# Patient Record
Sex: MALE | Race: WHITE | NOT HISPANIC OR LATINO | URBAN - METROPOLITAN AREA
[De-identification: names, ages, dates, MRNs, and addresses within clinical notes are randomized per-mention and may not be internally consistent; named-entity substitution may affect disease eponyms.]

---

## 2020-06-16 ENCOUNTER — OFFICE VISIT (OUTPATIENT)
Dept: URBAN - METROPOLITAN AREA SURGERY CENTER 30 | Facility: SURGERY CENTER | Age: 70
End: 2020-06-16

## 2020-11-18 ENCOUNTER — WEB ENCOUNTER (OUTPATIENT)
Dept: URBAN - METROPOLITAN AREA CLINIC 74 | Facility: CLINIC | Age: 70
End: 2020-11-18

## 2020-11-18 ENCOUNTER — LAB OUTSIDE AN ENCOUNTER (OUTPATIENT)
Dept: URBAN - METROPOLITAN AREA CLINIC 74 | Facility: CLINIC | Age: 70
End: 2020-11-18

## 2020-11-18 ENCOUNTER — OFFICE VISIT (OUTPATIENT)
Dept: URBAN - METROPOLITAN AREA CLINIC 74 | Facility: CLINIC | Age: 70
End: 2020-11-18
Payer: MEDICARE

## 2020-11-18 VITALS
WEIGHT: 179.2 LBS | HEART RATE: 73 BPM | DIASTOLIC BLOOD PRESSURE: 76 MMHG | BODY MASS INDEX: 23.75 KG/M2 | SYSTOLIC BLOOD PRESSURE: 150 MMHG | TEMPERATURE: 98.1 F | HEIGHT: 73 IN

## 2020-11-18 DIAGNOSIS — J44.9 COPD (CHRONIC OBSTRUCTIVE PULMONARY DISEASE): ICD-10-CM

## 2020-11-18 DIAGNOSIS — J45.909 ASTHMA: ICD-10-CM

## 2020-11-18 DIAGNOSIS — K21.9 GERD (GASTROESOPHAGEAL REFLUX DISEASE): ICD-10-CM

## 2020-11-18 DIAGNOSIS — Z79.01 CHRONIC ANTICOAGULATION: ICD-10-CM

## 2020-11-18 DIAGNOSIS — Z86.010 PERSONAL HISTORY OF COLONIC POLYPS: ICD-10-CM

## 2020-11-18 DIAGNOSIS — K59.01 CONSTIPATION, SLOW TRANSIT: ICD-10-CM

## 2020-11-18 DIAGNOSIS — N81.6 RECTOCELE: ICD-10-CM

## 2020-11-18 DIAGNOSIS — I10 ESSENTIAL HYPERTENSION: ICD-10-CM

## 2020-11-18 DIAGNOSIS — I73.9 PERIPHERAL VASCULAR DISEASE: ICD-10-CM

## 2020-11-18 DIAGNOSIS — R14.0 BLOATING: ICD-10-CM

## 2020-11-18 DIAGNOSIS — Z85.46 HISTORY OF PROSTATE CANCER: ICD-10-CM

## 2020-11-18 PROCEDURE — G8420 CALC BMI NORM PARAMETERS: HCPCS | Performed by: INTERNAL MEDICINE

## 2020-11-18 PROCEDURE — G8754 DIAS BP LESS 90: HCPCS | Performed by: INTERNAL MEDICINE

## 2020-11-18 PROCEDURE — G8427 DOCREV CUR MEDS BY ELIG CLIN: HCPCS | Performed by: INTERNAL MEDICINE

## 2020-11-18 PROCEDURE — G8753 SYS BP > OR = 140: HCPCS | Performed by: INTERNAL MEDICINE

## 2020-11-18 PROCEDURE — 99213 OFFICE O/P EST LOW 20 MIN: CPT | Performed by: INTERNAL MEDICINE

## 2020-11-18 PROCEDURE — 3017F COLORECTAL CA SCREEN DOC REV: CPT | Performed by: INTERNAL MEDICINE

## 2020-11-18 PROCEDURE — G8482 FLU IMMUNIZE ORDER/ADMIN: HCPCS | Performed by: INTERNAL MEDICINE

## 2020-11-18 PROCEDURE — 1036F TOBACCO NON-USER: CPT | Performed by: INTERNAL MEDICINE

## 2020-11-18 RX ORDER — RIVAROXABAN 20 MG/1
TAKE 1 TABLET (20 MG) BY ORAL ROUTE ONCE DAILY WITH THE EVENING MEAL TABLET, FILM COATED ORAL 1
Qty: 0 | Refills: 0 | Status: ACTIVE | COMMUNITY
Start: 1900-01-01

## 2020-11-18 RX ORDER — DICLOFENAC SODIUM 10 MG/G
GEL TOPICAL
Qty: 0 | Refills: 0 | Status: ACTIVE | COMMUNITY
Start: 1900-01-01

## 2020-11-18 RX ORDER — GABAPENTIN 300 MG/1
TAKE 1 CAPSULE (300 MG) BY ORAL ROUTE 3 TIMES PER DAY CAPSULE ORAL
Qty: 0 | Refills: 0 | Status: ACTIVE | COMMUNITY
Start: 1900-01-01

## 2020-11-18 RX ORDER — CLOPIDOGREL BISULFATE 75 MG
TAKE 1 TABLET (75 MG) BY ORAL ROUTE ONCE DAILY TABLET ORAL 1
Qty: 0 | Refills: 0 | Status: ACTIVE | COMMUNITY
Start: 1900-01-01

## 2020-11-18 RX ORDER — ATORVASTATIN CALCIUM 20 MG/1
TABLET, FILM COATED ORAL
Qty: 0 | Refills: 0 | Status: ACTIVE | COMMUNITY
Start: 1900-01-01

## 2020-11-18 RX ORDER — LISINOPRIL AND HYDROCHLOROTHIAZIDE TABLETS 20; 12.5 MG/1; MG/1
TAKE 1 TABLET BY ORAL ROUTE ONCE DAILY TABLET ORAL 1
Qty: 0 | Refills: 0 | Status: ACTIVE | COMMUNITY
Start: 1900-01-01

## 2020-11-18 RX ORDER — ASPIRIN 81 MG/1
TAKE 1 TABLET (81 MG) BY ORAL ROUTE ONCE DAILY TABLET, COATED ORAL 1
Qty: 0 | Refills: 0 | Status: ACTIVE | COMMUNITY
Start: 1900-01-01

## 2020-11-18 NOTE — HPI-TODAY'S VISIT:
Today November 18, 2020 the patient returns for a follow-up visit, the patient was last seen in the office on February 24, 2020 with generalized abdominal tenderness, constipation due to slow transit and outlet dysfunction, abdominal bloating, personal history of colonic polyps, chronic anticoagulation, peripheral vascular disease, history of prostate cancer, COPD, hypertension, asthma and a history of poliomyelitis. The patient had a colonoscopy 4 years before the visit and it showed several serrated adenomas.. The patient was advised to have a sits marker study, and an MR defecography and a colonoscopy. The patient had the MR defecogram on March 6, 2020 and it showed a small anterior rectocele, no evidence of rectal intussusception or dyssynergic defecation. The patient had the sits marker study and on March 11, 2022 frontal views of the abdomen where evaluated, on that particular day no definite markers were seen in the ascending and transverse colon.  There was one marker seen in the left colon.  There was a moderate amount of stool and gas seen throughout the colon, there was no evidence of bowel obstruction. The colonoscopy was not performed due to scheduling issues including COVID-19. The patient has been cleared by vascular surgery to hold anticoagulation. The patient continues to have severe constipation, along with a constipation he gets pain across the lower back and upper abdomen, he denies any nausea, vomiting, rectal bleeding or hematochezia. The patient will be scheduled to have the colonoscopy.  During a previous colonoscopy I did remove multiple colonic polyps, benefits potential complications and alternatives to colonoscopy have been disclosed.

## 2020-12-07 ENCOUNTER — OFFICE VISIT (OUTPATIENT)
Dept: URBAN - METROPOLITAN AREA SURGERY CENTER 30 | Facility: SURGERY CENTER | Age: 70
End: 2020-12-07
Payer: MEDICARE

## 2020-12-07 DIAGNOSIS — K62.1 DYSPLASTIC POLYP OF RECTUM: ICD-10-CM

## 2020-12-07 DIAGNOSIS — D12.2 ADENOMA OF ASCENDING COLON: ICD-10-CM

## 2020-12-07 DIAGNOSIS — D12.0 ADENOMA OF CECUM: ICD-10-CM

## 2020-12-07 DIAGNOSIS — Z86.010 H/O ADENOMATOUS POLYP OF COLON: ICD-10-CM

## 2020-12-07 DIAGNOSIS — D12.3 ADENOMA OF TRANSVERSE COLON: ICD-10-CM

## 2020-12-07 PROCEDURE — G8907 PT DOC NO EVENTS ON DISCHARG: HCPCS | Performed by: INTERNAL MEDICINE

## 2020-12-07 PROCEDURE — 45385 COLONOSCOPY W/LESION REMOVAL: CPT | Performed by: INTERNAL MEDICINE

## 2020-12-07 PROCEDURE — G9936 PMH PLYP/NEO CO/RECT/JUN/ANS: HCPCS | Performed by: INTERNAL MEDICINE

## 2020-12-07 PROCEDURE — 45380 COLONOSCOPY AND BIOPSY: CPT | Performed by: INTERNAL MEDICINE

## 2020-12-07 RX ORDER — ATORVASTATIN CALCIUM 20 MG/1
TABLET, FILM COATED ORAL
Qty: 0 | Refills: 0 | Status: ACTIVE | COMMUNITY
Start: 1900-01-01

## 2020-12-07 RX ORDER — ASPIRIN 81 MG/1
TAKE 1 TABLET (81 MG) BY ORAL ROUTE ONCE DAILY TABLET, COATED ORAL 1
Qty: 0 | Refills: 0 | Status: ACTIVE | COMMUNITY
Start: 1900-01-01

## 2020-12-07 RX ORDER — GABAPENTIN 300 MG/1
TAKE 1 CAPSULE (300 MG) BY ORAL ROUTE 3 TIMES PER DAY CAPSULE ORAL
Qty: 0 | Refills: 0 | Status: ACTIVE | COMMUNITY
Start: 1900-01-01

## 2020-12-07 RX ORDER — LISINOPRIL AND HYDROCHLOROTHIAZIDE TABLETS 20; 12.5 MG/1; MG/1
TAKE 1 TABLET BY ORAL ROUTE ONCE DAILY TABLET ORAL 1
Qty: 0 | Refills: 0 | Status: ACTIVE | COMMUNITY
Start: 1900-01-01

## 2020-12-07 RX ORDER — DICLOFENAC SODIUM 10 MG/G
GEL TOPICAL
Qty: 0 | Refills: 0 | Status: ACTIVE | COMMUNITY
Start: 1900-01-01

## 2020-12-07 RX ORDER — RIVAROXABAN 20 MG/1
TAKE 1 TABLET (20 MG) BY ORAL ROUTE ONCE DAILY WITH THE EVENING MEAL TABLET, FILM COATED ORAL 1
Qty: 0 | Refills: 0 | Status: ACTIVE | COMMUNITY
Start: 1900-01-01

## 2020-12-07 RX ORDER — CLOPIDOGREL BISULFATE 75 MG
TAKE 1 TABLET (75 MG) BY ORAL ROUTE ONCE DAILY TABLET ORAL 1
Qty: 0 | Refills: 0 | Status: ACTIVE | COMMUNITY
Start: 1900-01-01

## 2021-01-07 ENCOUNTER — OFFICE VISIT (OUTPATIENT)
Dept: URBAN - METROPOLITAN AREA CLINIC 74 | Facility: CLINIC | Age: 71
End: 2021-01-07
Payer: MEDICARE

## 2021-01-07 VITALS
OXYGEN SATURATION: 98 % | WEIGHT: 195.4 LBS | HEART RATE: 65 BPM | BODY MASS INDEX: 25.9 KG/M2 | HEIGHT: 73 IN | SYSTOLIC BLOOD PRESSURE: 142 MMHG | TEMPERATURE: 97.5 F | DIASTOLIC BLOOD PRESSURE: 68 MMHG

## 2021-01-07 DIAGNOSIS — D12.6 SERRATED ADENOMA OF COLON: ICD-10-CM

## 2021-01-07 DIAGNOSIS — D12.2 ADENOMATOUS POLYP OF ASCENDING COLON: ICD-10-CM

## 2021-01-07 DIAGNOSIS — Z86.010 PERSONAL HISTORY OF COLONIC POLYPS: ICD-10-CM

## 2021-01-07 DIAGNOSIS — K21.9 GERD (GASTROESOPHAGEAL REFLUX DISEASE): ICD-10-CM

## 2021-01-07 PROCEDURE — G8419 CALC BMI OUT NRM PARAM NOF/U: HCPCS | Performed by: INTERNAL MEDICINE

## 2021-01-07 PROCEDURE — 99213 OFFICE O/P EST LOW 20 MIN: CPT | Performed by: INTERNAL MEDICINE

## 2021-01-07 PROCEDURE — G8427 DOCREV CUR MEDS BY ELIG CLIN: HCPCS | Performed by: INTERNAL MEDICINE

## 2021-01-07 PROCEDURE — 3017F COLORECTAL CA SCREEN DOC REV: CPT | Performed by: INTERNAL MEDICINE

## 2021-01-07 PROCEDURE — G8753 SYS BP > OR = 140: HCPCS | Performed by: INTERNAL MEDICINE

## 2021-01-07 PROCEDURE — G8754 DIAS BP LESS 90: HCPCS | Performed by: INTERNAL MEDICINE

## 2021-01-07 PROCEDURE — G9903 PT SCRN TBCO ID AS NON USER: HCPCS | Performed by: INTERNAL MEDICINE

## 2021-01-07 PROCEDURE — 1036F TOBACCO NON-USER: CPT | Performed by: INTERNAL MEDICINE

## 2021-01-07 PROCEDURE — G8482 FLU IMMUNIZE ORDER/ADMIN: HCPCS | Performed by: INTERNAL MEDICINE

## 2021-01-07 RX ORDER — ATORVASTATIN CALCIUM 20 MG/1
TABLET, FILM COATED ORAL
Qty: 0 | Refills: 0 | Status: ACTIVE | COMMUNITY
Start: 1900-01-01

## 2021-01-07 RX ORDER — DICLOFENAC SODIUM 10 MG/G
GEL TOPICAL
Qty: 0 | Refills: 0 | Status: ACTIVE | COMMUNITY
Start: 1900-01-01

## 2021-01-07 RX ORDER — LISINOPRIL AND HYDROCHLOROTHIAZIDE TABLETS 20; 12.5 MG/1; MG/1
TAKE 1 TABLET BY ORAL ROUTE ONCE DAILY TABLET ORAL 1
Qty: 0 | Refills: 0 | Status: ACTIVE | COMMUNITY
Start: 1900-01-01

## 2021-01-07 RX ORDER — GABAPENTIN 300 MG/1
TAKE 1 CAPSULE (300 MG) BY ORAL ROUTE 3 TIMES PER DAY CAPSULE ORAL
Qty: 0 | Refills: 0 | Status: ACTIVE | COMMUNITY
Start: 1900-01-01

## 2021-01-07 RX ORDER — ASPIRIN 81 MG/1
TAKE 1 TABLET (81 MG) BY ORAL ROUTE ONCE DAILY TABLET, COATED ORAL 1
Qty: 0 | Refills: 0 | Status: ACTIVE | COMMUNITY
Start: 1900-01-01

## 2021-01-07 RX ORDER — PANTOPRAZOLE SODIUM 40 MG/1
TAKE 1 TABLET (40 MG) BY ORAL ROUTE ONCE DAILY FOR 30 DAYS TABLET, DELAYED RELEASE ORAL ONCE A DAY
Qty: 90 | Refills: 1 | OUTPATIENT
Start: 2021-01-07

## 2021-01-07 RX ORDER — CLOPIDOGREL BISULFATE 75 MG
TAKE 1 TABLET (75 MG) BY ORAL ROUTE ONCE DAILY TABLET ORAL 1
Qty: 0 | Refills: 0 | Status: ACTIVE | COMMUNITY
Start: 1900-01-01

## 2021-01-07 RX ORDER — RIVAROXABAN 20 MG/1
TAKE 1 TABLET (20 MG) BY ORAL ROUTE ONCE DAILY WITH THE EVENING MEAL TABLET, FILM COATED ORAL 1
Qty: 0 | Refills: 0 | Status: ACTIVE | COMMUNITY
Start: 1900-01-01

## 2021-01-07 NOTE — HPI-TODAY'S VISIT:
Today November 18, 2020 the patient returns for a follow-up visit, the patient was last seen in the office on February 24, 2020 with generalized abdominal tenderness, constipation due to slow transit and outlet dysfunction, abdominal bloating, personal history of colonic polyps, chronic anticoagulation, peripheral vascular disease, history of prostate cancer, COPD, hypertension, asthma and a history of poliomyelitis. The patient had a colonoscopy 4 years before the visit and it showed several serrated adenomas.. The patient was advised to have a sits marker study, and an MR defecography and a colonoscopy. The patient had the MR defecogram on March 6, 2020 and it showed a small anterior rectocele, no evidence of rectal intussusception or dyssynergic defecation. The patient had the sits marker study and on March 11, 2022 frontal views of the abdomen where evaluated, on that particular day no definite markers were seen in the ascending and transverse colon.  There was one marker seen in the left colon.  There was a moderate amount of stool and gas seen throughout the colon, there was no evidence of bowel obstruction. The colonoscopy was not performed due to scheduling issues including COVID-19. The patient has been cleared by vascular surgery to hold anticoagulation. The patient continues to have severe constipation, along with a constipation he gets pain across the lower back and upper abdomen, he denies any nausea, vomiting, rectal bleeding or hematochezia. The patient will be scheduled to have the colonoscopy.  During a previous colonoscopy I did remove multiple colonic polyps, benefits potential complications and alternatives to colonoscopy have been disclosed. Today January 7, 2021 the patient returns for a follow-up visit, the patient was last seen in the office on November 18, 2020 with slow transit constipation, and rectocele, personal history of colonic polyps, bloating, GERD, history of prostate cancer, asthma, essential hypertension, COPD, peripheral vascular disease on chronic anticoagulation. At the time of the visit the patient complaint of severe constipation, along with a constipation he did get pain across the lower back and upper abdomen, the patient denied having any nausea, vomiting or rectal bleeding.  The patient was scheduled to have a colonoscopy, during the previous colonoscopy I did remove multiple colonic polyps.  The patient had a sitz marker study in March 2020 that showed a single marker in the left colon, there was a moderate amount of stool and gas throughout the colon with no evidence of obstruction.  An MR defecography showed a small anterior rectocele with no evidence of rectal intussusception or dyssynergic defecation. The colonoscopy was performed on December 17, 2020, multiple polyps were encountered, there was a 20 mm sessile cecal serrated adenomatous polyp which was removed with a hot snare, a 5 mm ascending colon sessile serrated adenomatous polyp removed with a cold snare, a 12 mm ascending colon semipedunculated adenomatous polyp removed with a hot snare, the patient required 1 hemostatic clip to prevent bleeding, there was a 5 mm sessile polyp removed from the transverse colon with a cold snare and a 3 mm rectal hyperplastic polyp removed from the rectum with cold biopsy forceps. Today the patient returns stating that he continues to have some difficulty expelling stool, slight constipation, in the past he did respond to MiraLAX 17 g and he has been advised to restart MiraLAX 17 g either daily or every other day. The patient denied having any abdominal pain but does complain of increasing flatulence which improves after defecation. The patient's gastroesophageal reflux remains a problem, the patient was instructed to follow antireflux measures and diet, the patient will start pantoprazole 40 mg daily. I discussed at length with the sitz marker study and the MR defecogram, the patient is not aware that he does have a small rectocele. The patient has been advised to have a surveillance colonoscopy in 6 months, he did have several serrated adenomatous polyps, and in the past multiple polyps had been removed. The patient will return for a follow-up visit in 6 months and will be scheduled for the colonoscopy at the time.

## 2021-03-25 ENCOUNTER — ERX REFILL RESPONSE (OUTPATIENT)
Dept: URBAN - METROPOLITAN AREA CLINIC 74 | Facility: CLINIC | Age: 71
End: 2021-03-25

## 2021-03-25 RX ORDER — PANTOPRAZOLE SODIUM 40 MG/1
TAKE 1 TABLET (40 MG) BY ORAL ROUTE ONCE DAILY FOR 30 DAYS TABLET, DELAYED RELEASE ORAL ONCE A DAY
Qty: 90 | Refills: 0

## 2021-06-15 ENCOUNTER — ERX REFILL RESPONSE (OUTPATIENT)
Dept: URBAN - METROPOLITAN AREA CLINIC 74 | Facility: CLINIC | Age: 71
End: 2021-06-15

## 2021-06-15 RX ORDER — PANTOPRAZOLE SODIUM 40 MG/1
TAKE 1 TABLET (40 MG) BY ORAL ROUTE ONCE DAILY FOR 30 DAYS TABLET, DELAYED RELEASE ORAL ONCE A DAY
Qty: 90 | Refills: 0

## 2021-08-18 ENCOUNTER — OFFICE VISIT (OUTPATIENT)
Dept: URBAN - METROPOLITAN AREA CLINIC 74 | Facility: CLINIC | Age: 71
End: 2021-08-18
Payer: MEDICARE

## 2021-08-18 ENCOUNTER — TELEPHONE ENCOUNTER (OUTPATIENT)
Dept: URBAN - METROPOLITAN AREA CLINIC 74 | Facility: CLINIC | Age: 71
End: 2021-08-18

## 2021-08-18 ENCOUNTER — LAB OUTSIDE AN ENCOUNTER (OUTPATIENT)
Dept: URBAN - METROPOLITAN AREA CLINIC 74 | Facility: CLINIC | Age: 71
End: 2021-08-18

## 2021-08-18 ENCOUNTER — WEB ENCOUNTER (OUTPATIENT)
Dept: URBAN - METROPOLITAN AREA CLINIC 74 | Facility: CLINIC | Age: 71
End: 2021-08-18

## 2021-08-18 VITALS
HEART RATE: 70 BPM | BODY MASS INDEX: 25.37 KG/M2 | TEMPERATURE: 98.2 F | WEIGHT: 191.4 LBS | OXYGEN SATURATION: 97 % | DIASTOLIC BLOOD PRESSURE: 68 MMHG | SYSTOLIC BLOOD PRESSURE: 120 MMHG | HEIGHT: 73 IN

## 2021-08-18 DIAGNOSIS — K59.01 CONSTIPATION, SLOW TRANSIT: ICD-10-CM

## 2021-08-18 DIAGNOSIS — Z85.46 HISTORY OF PROSTATE CANCER: ICD-10-CM

## 2021-08-18 DIAGNOSIS — R14.0 BLOATING: ICD-10-CM

## 2021-08-18 DIAGNOSIS — Z86.010 PERSONAL HISTORY OF COLONIC POLYPS: ICD-10-CM

## 2021-08-18 DIAGNOSIS — Z79.01 CHRONIC ANTICOAGULATION: ICD-10-CM

## 2021-08-18 DIAGNOSIS — I10 ESSENTIAL HYPERTENSION: ICD-10-CM

## 2021-08-18 DIAGNOSIS — N81.6 RECTOCELE: ICD-10-CM

## 2021-08-18 DIAGNOSIS — J45.909 ASTHMA: ICD-10-CM

## 2021-08-18 DIAGNOSIS — J44.9 COPD (CHRONIC OBSTRUCTIVE PULMONARY DISEASE): ICD-10-CM

## 2021-08-18 DIAGNOSIS — D12.6 SERRATED ADENOMA OF COLON: ICD-10-CM

## 2021-08-18 DIAGNOSIS — K21.9 GERD (GASTROESOPHAGEAL REFLUX DISEASE): ICD-10-CM

## 2021-08-18 DIAGNOSIS — D12.2 ADENOMATOUS POLYP OF ASCENDING COLON: ICD-10-CM

## 2021-08-18 PROCEDURE — 99213 OFFICE O/P EST LOW 20 MIN: CPT | Performed by: INTERNAL MEDICINE

## 2021-08-18 RX ORDER — PANTOPRAZOLE SODIUM 40 MG/1
TAKE 1 TABLET (40 MG) BY ORAL ROUTE ONCE DAILY FOR 30 DAYS TABLET, DELAYED RELEASE ORAL ONCE A DAY
Qty: 90 | Refills: 0 | Status: ACTIVE | COMMUNITY

## 2021-08-18 RX ORDER — POLYETHYLENE GLYCOL 3350 17 G/17G
AS DIRECTED POWDER, FOR SOLUTION ORAL
Status: ACTIVE | COMMUNITY

## 2021-08-18 RX ORDER — DICLOFENAC SODIUM 10 MG/G
GEL TOPICAL
Qty: 0 | Refills: 0 | Status: ACTIVE | COMMUNITY
Start: 1900-01-01

## 2021-08-18 RX ORDER — CLOPIDOGREL BISULFATE 75 MG
TAKE 1 TABLET (75 MG) BY ORAL ROUTE ONCE DAILY TABLET ORAL 1
Qty: 0 | Refills: 0 | Status: ACTIVE | COMMUNITY
Start: 1900-01-01

## 2021-08-18 RX ORDER — LISINOPRIL AND HYDROCHLOROTHIAZIDE TABLETS 20; 12.5 MG/1; MG/1
TAKE 1 TABLET BY ORAL ROUTE ONCE DAILY TABLET ORAL 1
Qty: 0 | Refills: 0 | Status: ACTIVE | COMMUNITY
Start: 1900-01-01

## 2021-08-18 RX ORDER — GABAPENTIN 300 MG/1
TAKE 1 CAPSULE (300 MG) BY ORAL ROUTE 3 TIMES PER DAY CAPSULE ORAL
Qty: 0 | Refills: 0 | Status: ACTIVE | COMMUNITY
Start: 1900-01-01

## 2021-08-18 RX ORDER — INSULIN ASPART 100 [IU]/ML
AS DIRECTED INJECTION, SUSPENSION SUBCUTANEOUS
Status: ACTIVE | COMMUNITY

## 2021-08-18 RX ORDER — PANTOPRAZOLE SODIUM 40 MG/1
TAKE 1 TABLET (40 MG) BY ORAL ROUTE ONCE DAILY FOR 30 DAYS TABLET, DELAYED RELEASE ORAL ONCE A DAY
OUTPATIENT

## 2021-08-18 RX ORDER — SODIUM PICOSULFATE, MAGNESIUM OXIDE, AND ANHYDROUS CITRIC ACID 10; 3.5; 12 MG/160ML; G/160ML; G/160ML
160 ML LIQUID ORAL
Qty: 320 MILLILITER | Refills: 0 | OUTPATIENT
Start: 2021-08-18 | End: 2021-08-19

## 2021-08-18 RX ORDER — ASPIRIN 81 MG/1
TAKE 1 TABLET (81 MG) BY ORAL ROUTE ONCE DAILY TABLET, COATED ORAL 1
Qty: 0 | Refills: 0 | Status: DISCONTINUED | COMMUNITY
Start: 1900-01-01

## 2021-08-18 RX ORDER — STANDARDIZED SENNA CONCENTRATE 8.6 MG/1
2 TABLETS AT BEDTIME AS NEEDED TABLET ORAL ONCE A DAY
Status: ACTIVE | COMMUNITY

## 2021-08-18 RX ORDER — ATORVASTATIN CALCIUM 20 MG/1
TABLET, FILM COATED ORAL
Qty: 0 | Refills: 0 | Status: ACTIVE | COMMUNITY
Start: 1900-01-01

## 2021-08-18 RX ORDER — RIVAROXABAN 20 MG/1
TAKE 1 TABLET (20 MG) BY ORAL ROUTE ONCE DAILY WITH THE EVENING MEAL TABLET, FILM COATED ORAL 1
Qty: 0 | Refills: 0 | Status: ACTIVE | COMMUNITY
Start: 1900-01-01

## 2021-08-18 RX ORDER — STANDARDIZED SENNA CONCENTRATE 8.6 MG/1
2 TABLETS AT BEDTIME AS NEEDED TABLET ORAL ONCE A DAY
OUTPATIENT

## 2021-08-18 NOTE — HPI-TODAY'S VISIT:
Today November 18, 2020 the patient returns for a follow-up visit, the patient was last seen in the office on February 24, 2020 with generalized abdominal tenderness, constipation due to slow transit and outlet dysfunction, abdominal bloating, personal history of colonic polyps, chronic anticoagulation, peripheral vascular disease, history of prostate cancer, COPD, hypertension, asthma and a history of poliomyelitis. The patient had a colonoscopy 4 years before the visit and it showed several serrated adenomas.. The patient was advised to have a sits marker study, and an MR defecography and a colonoscopy. The patient had the MR defecogram on March 6, 2020 and it showed a small anterior rectocele, no evidence of rectal intussusception or dyssynergic defecation. The patient had the sits marker study and on March 11, 2022 frontal views of the abdomen where evaluated, on that particular day no definite markers were seen in the ascending and transverse colon.  There was one marker seen in the left colon.  There was a moderate amount of stool and gas seen throughout the colon, there was no evidence of bowel obstruction. The colonoscopy was not performed due to scheduling issues including COVID-19. The patient has been cleared by vascular surgery to hold anticoagulation. The patient continues to have severe constipation, along with a constipation he gets pain across the lower back and upper abdomen, he denies any nausea, vomiting, rectal bleeding or hematochezia. The patient will be scheduled to have the colonoscopy.  During a previous colonoscopy I did remove multiple colonic polyps, benefits potential complications and alternatives to colonoscopy have been disclosed. Today January 7, 2021 the patient returns for a follow-up visit, the patient was last seen in the office on November 18, 2020 with slow transit constipation, and rectocele, personal history of colonic polyps, bloating, GERD, history of prostate cancer, asthma, essential hypertension, COPD, peripheral vascular disease on chronic anticoagulation. At the time of the visit the patient complaint of severe constipation, along with a constipation he did get pain across the lower back and upper abdomen, the patient denied having any nausea, vomiting or rectal bleeding.  The patient was scheduled to have a colonoscopy, during the previous colonoscopy I did remove multiple colonic polyps.  The patient had a sitz marker study in March 2020 that showed a single marker in the left colon, there was a moderate amount of stool and gas throughout the colon with no evidence of obstruction.  An MR defecography showed a small anterior rectocele with no evidence of rectal intussusception or dyssynergic defecation. The colonoscopy was performed on December 17, 2020, multiple polyps were encountered, there was a 20 mm sessile cecal serrated adenomatous polyp which was removed with a hot snare, a 5 mm ascending colon sessile serrated adenomatous polyp removed with a cold snare, a 12 mm ascending colon semipedunculated adenomatous polyp removed with a hot snare, the patient required 1 hemostatic clip to prevent bleeding, there was a 5 mm sessile polyp removed from the transverse colon with a cold snare and a 3 mm rectal hyperplastic polyp removed from the rectum with cold biopsy forceps. Today the patient returns stating that he continues to have some difficulty expelling stool, slight constipation, in the past he did respond to MiraLAX 17 g and he has been advised to restart MiraLAX 17 g either daily or every other day. The patient denied having any abdominal pain but does complain of increasing flatulence which improves after defecation. The patient's gastroesophageal reflux remains a problem, the patient was instructed to follow antireflux measures and diet, the patient will start pantoprazole 40 mg daily. I discussed at length with the sitz marker study and the MR defecogram, the patient is not aware that he does have a small rectocele. The patient has been advised to have a surveillance colonoscopy in 6 months, he did have several serrated adenomatous polyps, and in the past multiple polyps had been removed. The patient will return for a follow-up visit in 6 months and will be scheduled for the colonoscopy at the time. Today August 18, 2021 the patient returns for a follow-up visit, the patient was last seen in the office on January 7, 2021 with a personal history of colonic polyps, constipation and a rectocele, gastroesophageal reflux disease, bloating, history of prostate cancer, asthma, COPD, hypertension, peripheral vascular disease on chronic anticoagulation. At the time of the last visit the patient returned stating that he had some difficulty expelling stool, the patient had slight constipation, in the past did not respond to treatment with MiraLAX 17 g and was advised to take the MiraLAX either every day or every other day. The patient denied having any abdominal pain, complains of increasing flatulence which improved after defecation. The patient's gastroesophageal reflux remains a problem, the patient was instructed to follow antireflux measures and diet, the patient was advised to take pantoprazole 40 mg daily. At the time of the visit I did discuss at length the findings of the transit time with markers and MR defecogram, the patient was made aware that he had a rectocele. The patient was advised to have a surveillance colonoscopy in 6 months.  In the past he had several serrated adenomatous polyps as well as older types of polyps.  Today the patient returns to the office stating that while taking MiraLAX for several months he did well, subsequently he stopped taking the MiraLAX, after 6 to 8 weeks he again developed constipation so he replaced the MiraLAX with Senokot, currently he has 1 or 2 soft bowel movements per day, before BMs the patient develops abdominal bloating and some flatulence which improved after defecation.  The patient denied any rectal bleeding or hematochezia.  The patient's gastroesophageal reflux remains under control with pantoprazole 40 mg daily.  The patient is due to have a surveillance colonoscopy, at the time of the previous colonoscopy he had multiple adenomatous and serrated polyps.  The patient will be scheduled for the colonoscopy.  Benefits potential complications and alternatives to colonoscopy were disclosed.

## 2021-08-18 NOTE — PHYSICAL EXAM GASTROINTESTINAL
Abdomen , soft, nontender, nondistended , no guarding or rigidity , no masses palpable , normal bowel sounds , Liver and Spleen , no hepatomegaly present , no hepatosplenomegaly , liver nontender , spleen not palpable, diastasis recti.

## 2021-09-16 ENCOUNTER — TELEPHONE ENCOUNTER (OUTPATIENT)
Dept: URBAN - METROPOLITAN AREA CLINIC 74 | Facility: CLINIC | Age: 71
End: 2021-09-16

## 2021-10-05 ENCOUNTER — OFFICE VISIT (OUTPATIENT)
Dept: URBAN - METROPOLITAN AREA SURGERY CENTER 30 | Facility: SURGERY CENTER | Age: 71
End: 2021-10-05
Payer: MEDICARE

## 2021-10-05 ENCOUNTER — CLAIMS CREATED FROM THE CLAIM WINDOW (OUTPATIENT)
Dept: URBAN - METROPOLITAN AREA CLINIC 4 | Facility: CLINIC | Age: 71
End: 2021-10-05
Payer: MEDICARE

## 2021-10-05 DIAGNOSIS — D12.4 BENIGN NEOPLASM OF DESCENDING COLON: ICD-10-CM

## 2021-10-05 DIAGNOSIS — K63.89 POLYP, HYPERPLASTIC: ICD-10-CM

## 2021-10-05 DIAGNOSIS — D12.3 BENIGN NEOPLASM OF TRANSVERSE COLON: ICD-10-CM

## 2021-10-05 DIAGNOSIS — D12.3 ADENOMA OF TRANSVERSE COLON: ICD-10-CM

## 2021-10-05 DIAGNOSIS — Z86.010 ADENOMAS PERSONAL HISTORY OF COLONIC POLYPS: ICD-10-CM

## 2021-10-05 DIAGNOSIS — K62.1 ANAL AND RECTAL POLYP: ICD-10-CM

## 2021-10-05 DIAGNOSIS — D12.4 ADENOMA OF DESCENDING COLON: ICD-10-CM

## 2021-10-05 PROCEDURE — G8907 PT DOC NO EVENTS ON DISCHARG: HCPCS | Performed by: INTERNAL MEDICINE

## 2021-10-05 PROCEDURE — 88305 TISSUE EXAM BY PATHOLOGIST: CPT | Performed by: PATHOLOGY

## 2021-10-05 PROCEDURE — 45380 COLONOSCOPY AND BIOPSY: CPT | Performed by: INTERNAL MEDICINE

## 2021-10-05 PROCEDURE — 45385 COLONOSCOPY W/LESION REMOVAL: CPT | Performed by: INTERNAL MEDICINE

## 2021-10-05 RX ORDER — INSULIN ASPART 100 [IU]/ML
AS DIRECTED INJECTION, SUSPENSION SUBCUTANEOUS
Status: ACTIVE | COMMUNITY

## 2021-10-05 RX ORDER — GABAPENTIN 300 MG/1
TAKE 1 CAPSULE (300 MG) BY ORAL ROUTE 3 TIMES PER DAY CAPSULE ORAL
Qty: 0 | Refills: 0 | Status: ACTIVE | COMMUNITY
Start: 1900-01-01

## 2021-10-05 RX ORDER — STANDARDIZED SENNA CONCENTRATE 8.6 MG/1
2 TABLETS AT BEDTIME AS NEEDED TABLET ORAL ONCE A DAY
Status: ACTIVE | COMMUNITY

## 2021-10-05 RX ORDER — ATORVASTATIN CALCIUM 20 MG/1
TABLET, FILM COATED ORAL
Qty: 0 | Refills: 0 | Status: ACTIVE | COMMUNITY
Start: 1900-01-01

## 2021-10-05 RX ORDER — LISINOPRIL AND HYDROCHLOROTHIAZIDE TABLETS 20; 12.5 MG/1; MG/1
TAKE 1 TABLET BY ORAL ROUTE ONCE DAILY TABLET ORAL 1
Qty: 0 | Refills: 0 | Status: ACTIVE | COMMUNITY
Start: 1900-01-01

## 2021-10-05 RX ORDER — PANTOPRAZOLE SODIUM 40 MG/1
TAKE 1 TABLET (40 MG) BY ORAL ROUTE ONCE DAILY FOR 30 DAYS TABLET, DELAYED RELEASE ORAL ONCE A DAY
Status: ACTIVE | COMMUNITY

## 2021-10-05 RX ORDER — DICLOFENAC SODIUM 10 MG/G
GEL TOPICAL
Qty: 0 | Refills: 0 | Status: ACTIVE | COMMUNITY
Start: 1900-01-01

## 2021-10-05 RX ORDER — POLYETHYLENE GLYCOL 3350 17 G/17G
AS DIRECTED POWDER, FOR SOLUTION ORAL
Status: ACTIVE | COMMUNITY

## 2021-10-05 RX ORDER — CLOPIDOGREL BISULFATE 75 MG
TAKE 1 TABLET (75 MG) BY ORAL ROUTE ONCE DAILY TABLET ORAL 1
Qty: 0 | Refills: 0 | Status: ACTIVE | COMMUNITY
Start: 1900-01-01

## 2021-10-05 RX ORDER — RIVAROXABAN 20 MG/1
TAKE 1 TABLET (20 MG) BY ORAL ROUTE ONCE DAILY WITH THE EVENING MEAL TABLET, FILM COATED ORAL 1
Qty: 0 | Refills: 0 | Status: ACTIVE | COMMUNITY
Start: 1900-01-01

## 2021-10-20 ENCOUNTER — OFFICE VISIT (OUTPATIENT)
Dept: URBAN - METROPOLITAN AREA CLINIC 74 | Facility: CLINIC | Age: 71
End: 2021-10-20
Payer: MEDICARE

## 2021-10-20 VITALS
HEIGHT: 73 IN | TEMPERATURE: 98.2 F | HEART RATE: 73 BPM | WEIGHT: 189.2 LBS | BODY MASS INDEX: 25.08 KG/M2 | OXYGEN SATURATION: 96 % | DIASTOLIC BLOOD PRESSURE: 62 MMHG | SYSTOLIC BLOOD PRESSURE: 120 MMHG

## 2021-10-20 DIAGNOSIS — K63.5 HYPERPLASTIC POLYP OF CECUM: ICD-10-CM

## 2021-10-20 DIAGNOSIS — K59.01 CONSTIPATION, SLOW TRANSIT: ICD-10-CM

## 2021-10-20 DIAGNOSIS — K21.9 GERD (GASTROESOPHAGEAL REFLUX DISEASE): ICD-10-CM

## 2021-10-20 DIAGNOSIS — D12.2 ADENOMATOUS POLYP OF ASCENDING COLON: ICD-10-CM

## 2021-10-20 DIAGNOSIS — Z86.010 PERSONAL HISTORY OF COLONIC POLYPS: ICD-10-CM

## 2021-10-20 DIAGNOSIS — R14.0 BLOATING: ICD-10-CM

## 2021-10-20 DIAGNOSIS — K62.1 HYPERPLASTIC RECTAL POLYP: ICD-10-CM

## 2021-10-20 DIAGNOSIS — J45.909 ASTHMA: ICD-10-CM

## 2021-10-20 DIAGNOSIS — Z85.46 HISTORY OF PROSTATE CANCER: ICD-10-CM

## 2021-10-20 DIAGNOSIS — N81.6 RECTOCELE: ICD-10-CM

## 2021-10-20 DIAGNOSIS — D12.3 ADENOMATOUS POLYP OF TRANSVERSE COLON: ICD-10-CM

## 2021-10-20 PROCEDURE — 99213 OFFICE O/P EST LOW 20 MIN: CPT | Performed by: INTERNAL MEDICINE

## 2021-10-20 RX ORDER — STANDARDIZED SENNA CONCENTRATE 8.6 MG/1
2 TABLETS AT BEDTIME AS NEEDED TABLET ORAL ONCE A DAY
OUTPATIENT

## 2021-10-20 RX ORDER — PANTOPRAZOLE SODIUM 40 MG/1
TAKE 1 TABLET (40 MG) BY ORAL ROUTE ONCE DAILY FOR 30 DAYS TABLET, DELAYED RELEASE ORAL ONCE A DAY
OUTPATIENT

## 2021-10-20 RX ORDER — ATORVASTATIN CALCIUM 20 MG/1
TABLET, FILM COATED ORAL
Qty: 0 | Refills: 0 | Status: ACTIVE | COMMUNITY
Start: 1900-01-01

## 2021-10-20 RX ORDER — POLYETHYLENE GLYCOL 3350 17 G/17G
AS DIRECTED POWDER, FOR SOLUTION ORAL
Status: ACTIVE | COMMUNITY

## 2021-10-20 RX ORDER — INSULIN ASPART 100 [IU]/ML
AS DIRECTED INJECTION, SUSPENSION SUBCUTANEOUS
Status: ACTIVE | COMMUNITY

## 2021-10-20 RX ORDER — STANDARDIZED SENNA CONCENTRATE 8.6 MG/1
2 TABLETS AT BEDTIME AS NEEDED TABLET ORAL ONCE A DAY
Status: ACTIVE | COMMUNITY

## 2021-10-20 RX ORDER — DICLOFENAC SODIUM 10 MG/G
GEL TOPICAL
Qty: 0 | Refills: 0 | Status: ACTIVE | COMMUNITY
Start: 1900-01-01

## 2021-10-20 RX ORDER — CLOPIDOGREL BISULFATE 75 MG
TAKE 1 TABLET (75 MG) BY ORAL ROUTE ONCE DAILY TABLET ORAL 1
Qty: 0 | Refills: 0 | Status: ACTIVE | COMMUNITY
Start: 1900-01-01

## 2021-10-20 RX ORDER — PANTOPRAZOLE SODIUM 40 MG/1
TAKE 1 TABLET (40 MG) BY ORAL ROUTE ONCE DAILY FOR 30 DAYS TABLET, DELAYED RELEASE ORAL ONCE A DAY
Status: ACTIVE | COMMUNITY

## 2021-10-20 RX ORDER — LISINOPRIL AND HYDROCHLOROTHIAZIDE TABLETS 20; 12.5 MG/1; MG/1
TAKE 1 TABLET BY ORAL ROUTE ONCE DAILY TABLET ORAL 1
Qty: 0 | Refills: 0 | Status: ACTIVE | COMMUNITY
Start: 1900-01-01

## 2021-10-20 RX ORDER — GABAPENTIN 300 MG/1
TAKE 1 CAPSULE (300 MG) BY ORAL ROUTE 3 TIMES PER DAY CAPSULE ORAL
Qty: 0 | Refills: 0 | Status: ACTIVE | COMMUNITY
Start: 1900-01-01

## 2021-10-20 RX ORDER — RIVAROXABAN 20 MG/1
TAKE 1 TABLET (20 MG) BY ORAL ROUTE ONCE DAILY WITH THE EVENING MEAL TABLET, FILM COATED ORAL 1
Qty: 0 | Refills: 0 | Status: ACTIVE | COMMUNITY
Start: 1900-01-01

## 2021-10-20 NOTE — HPI-TODAY'S VISIT:
Today November 18, 2020 the patient returns for a follow-up visit, the patient was last seen in the office on February 24, 2020 with generalized abdominal tenderness, constipation due to slow transit and outlet dysfunction, abdominal bloating, personal history of colonic polyps, chronic anticoagulation, peripheral vascular disease, history of prostate cancer, COPD, hypertension, asthma and a history of poliomyelitis. The patient had a colonoscopy 4 years before the visit and it showed several serrated adenomas.. The patient was advised to have a sits marker study, and an MR defecography and a colonoscopy. The patient had the MR defecogram on March 6, 2020 and it showed a small anterior rectocele, no evidence of rectal intussusception or dyssynergic defecation. The patient had the sits marker study and on March 11, 2022 frontal views of the abdomen where evaluated, on that particular day no definite markers were seen in the ascending and transverse colon.  There was one marker seen in the left colon.  There was a moderate amount of stool and gas seen throughout the colon, there was no evidence of bowel obstruction. The colonoscopy was not performed due to scheduling issues including COVID-19. The patient has been cleared by vascular surgery to hold anticoagulation. The patient continues to have severe constipation, along with a constipation he gets pain across the lower back and upper abdomen, he denies any nausea, vomiting, rectal bleeding or hematochezia. The patient will be scheduled to have the colonoscopy.  During a previous colonoscopy I did remove multiple colonic polyps, benefits potential complications and alternatives to colonoscopy have been disclosed. Today January 7, 2021 the patient returns for a follow-up visit, the patient was last seen in the office on November 18, 2020 with slow transit constipation, and rectocele, personal history of colonic polyps, bloating, GERD, history of prostate cancer, asthma, essential hypertension, COPD, peripheral vascular disease on chronic anticoagulation. At the time of the visit the patient complaint of severe constipation, along with a constipation he did get pain across the lower back and upper abdomen, the patient denied having any nausea, vomiting or rectal bleeding.  The patient was scheduled to have a colonoscopy, during the previous colonoscopy I did remove multiple colonic polyps.  The patient had a sitz marker study in March 2020 that showed a single marker in the left colon, there was a moderate amount of stool and gas throughout the colon with no evidence of obstruction.  An MR defecography showed a small anterior rectocele with no evidence of rectal intussusception or dyssynergic defecation. The colonoscopy was performed on December 17, 2020, multiple polyps were encountered, there was a 20 mm sessile cecal serrated adenomatous polyp which was removed with a hot snare, a 5 mm ascending colon sessile serrated adenomatous polyp removed with a cold snare, a 12 mm ascending colon semipedunculated adenomatous polyp removed with a hot snare, the patient required 1 hemostatic clip to prevent bleeding, there was a 5 mm sessile polyp removed from the transverse colon with a cold snare and a 3 mm rectal hyperplastic polyp removed from the rectum with cold biopsy forceps. Today the patient returns stating that he continues to have some difficulty expelling stool, slight constipation, in the past he did respond to MiraLAX 17 g and he has been advised to restart MiraLAX 17 g either daily or every other day. The patient denied having any abdominal pain but does complain of increasing flatulence which improves after defecation. The patient's gastroesophageal reflux remains a problem, the patient was instructed to follow antireflux measures and diet, the patient will start pantoprazole 40 mg daily. I discussed at length with the sitz marker study and the MR defecogram, the patient is not aware that he does have a small rectocele. The patient has been advised to have a surveillance colonoscopy in 6 months, he did have several serrated adenomatous polyps, and in the past multiple polyps had been removed. The patient will return for a follow-up visit in 6 months and will be scheduled for the colonoscopy at the time. Today August 18, 2021 the patient returns for a follow-up visit, the patient was last seen in the office on January 7, 2021 with a personal history of colonic polyps, constipation and a rectocele, gastroesophageal reflux disease, bloating, history of prostate cancer, asthma, COPD, hypertension, peripheral vascular disease on chronic anticoagulation. At the time of the last visit the patient returned stating that he had some difficulty expelling stool, the patient had slight constipation, in the past did not respond to treatment with MiraLAX 17 g and was advised to take the MiraLAX either every day or every other day. The patient denied having any abdominal pain, complains of increasing flatulence which improved after defecation. The patient's gastroesophageal reflux remains a problem, the patient was instructed to follow antireflux measures and diet, the patient was advised to take pantoprazole 40 mg daily. At the time of the visit I did discuss at length the findings of the transit time with markers and MR defecogram, the patient was made aware that he had a rectocele. The patient was advised to have a surveillance colonoscopy in 6 months.  In the past he had several serrated adenomatous polyps as well as older types of polyps.  Today the patient returns to the office stating that while taking MiraLAX for several months he did well, subsequently he stopped taking the MiraLAX, after 6 to 8 weeks he again developed constipation so he replaced the MiraLAX with Senokot, currently he has 1 or 2 soft bowel movements per day, before BMs the patient develops abdominal bloating and some flatulence which improved after defecation.  The patient denied any rectal bleeding or hematochezia.  The patient's gastroesophageal reflux remains under control with pantoprazole 40 mg daily.  The patient is due to have a surveillance colonoscopy, at the time of the previous colonoscopy he had multiple adenomatous and serrated polyps.  The patient will be scheduled for the colonoscopy.  Benefits potential complications and alternatives to colonoscopy were disclosed.  Today October 20, 2021 the patient returns for a follow-up visit, the patient was last seen in the office on August 18, 2021 with a personal history of colonic polyps, constipation, rectocele, GERD, bloating, history of prostate cancer, asthma, hypertension, COPD, peripheral vascular disease on chronic anticoagulation.  At the time of the last visit the patient returns stating that while taking MiraLAX for several months he did well, subsequently he stopped taking the MiraLAX and 6 to 8 weeks later he developed constipation, he replaced the MiraLAX with Senokot and was having 1-2 soft bowel movements per day, prior to bowel movements he developed abdominal bloating and some flatulence, all improved after defecation.  The patient denied having any rectal bleeding or hematochezia.  The gastroesophageal reflux remains under control with pantoprazole 40 mg daily.  The patient was advised to get a surveillance colonoscopy.  At the time of the previous colonoscopy he had multiple colonic polyps.  The colonoscopy was performed on October 5, 2021, at the time he was found to have perianal skin tags, 7 hyperplastic polyps were removed from the rectum measuring between 2 and 3 mm, there was a 5 mm ascending adenomatous colon polyp which was sessile and removed with a cold snare, a 3 mm adenomatous polyp removed from the transverse colon he was also sessile and a 3 mm cecal hyperplastic polyp removed with cold biopsy forceps.  The patient will be advised to have a follow-up surveillance colonoscopy in 1 year.  Today the patient returns to the office stating that he is mostly doing well, his main complaint at this point in time is no longer constipation or abdominal bloating, he seems to be defecating daily ever since he had the colonoscopy.  The patient does complain of gastroesophageal reflux, he has mostly acid reflux in the evening when he lies down and has agreed to elevate the head of his bed.  The patient did not take the pantoprazole as instructed and is taking an over-the-counter omeprazole tablet with only relative improvement.  I discussed at length with the patient antireflux measures and diet as well as the use of the medication and the elevation of the head of the bed.  The patient will try it for the next 60 days if not improved he agreed to have an EGD.  The patient will return for a follow-up visit in 60 days.  The patient is to have a surveillance colonoscopy in 1 year.  I discussed at length with the patient the recent colonoscopy and pathology reports.

## 2021-10-20 NOTE — PHYSICAL EXAM NECK/THYROID:
normal appearance , without tenderness upon palpation , no deformities , trachea midline , Thyroid normal size , no thyroid nodules , no masses , no JVD , thyroid nontender , normal appearance , without tenderness upon palpation , no deformities , trachea midline , Thyroid normal size , no thyroid nodules , no masses , no JVD , thyroid nontender
Self

## 2021-10-20 NOTE — PHYSICAL EXAM GASTROINTESTINAL
Abdomen , soft, nontender, nondistended , no guarding or rigidity , no masses palpable , normal bowel sounds , Liver and Spleen , no hepatomegaly present , no hepatosplenomegaly , liver nontender , spleen not palpabe.

## 2021-12-13 ENCOUNTER — LAB OUTSIDE AN ENCOUNTER (OUTPATIENT)
Dept: URBAN - METROPOLITAN AREA CLINIC 74 | Facility: CLINIC | Age: 71
End: 2021-12-13

## 2021-12-13 ENCOUNTER — OFFICE VISIT (OUTPATIENT)
Dept: URBAN - METROPOLITAN AREA CLINIC 74 | Facility: CLINIC | Age: 71
End: 2021-12-13
Payer: MEDICARE

## 2021-12-13 VITALS
WEIGHT: 190.3 LBS | HEIGHT: 73 IN | TEMPERATURE: 97.9 F | SYSTOLIC BLOOD PRESSURE: 170 MMHG | BODY MASS INDEX: 25.22 KG/M2 | HEART RATE: 72 BPM | DIASTOLIC BLOOD PRESSURE: 80 MMHG | OXYGEN SATURATION: 98 %

## 2021-12-13 DIAGNOSIS — D12.2 ADENOMATOUS POLYP OF ASCENDING COLON: ICD-10-CM

## 2021-12-13 DIAGNOSIS — D12.3 ADENOMATOUS POLYP OF TRANSVERSE COLON: ICD-10-CM

## 2021-12-13 DIAGNOSIS — I73.9 PERIPHERAL VASCULAR DISEASE: ICD-10-CM

## 2021-12-13 DIAGNOSIS — K62.1 HYPERPLASTIC RECTAL POLYP: ICD-10-CM

## 2021-12-13 DIAGNOSIS — K59.01 CONSTIPATION, SLOW TRANSIT: ICD-10-CM

## 2021-12-13 DIAGNOSIS — K21.9 GERD (GASTROESOPHAGEAL REFLUX DISEASE): ICD-10-CM

## 2021-12-13 DIAGNOSIS — R14.0 BLOATING: ICD-10-CM

## 2021-12-13 DIAGNOSIS — R68.81 EARLY SATIETY: ICD-10-CM

## 2021-12-13 DIAGNOSIS — N81.6 RECTOCELE: ICD-10-CM

## 2021-12-13 DIAGNOSIS — I10 ESSENTIAL HYPERTENSION: ICD-10-CM

## 2021-12-13 DIAGNOSIS — K63.5 HYPERPLASTIC POLYP OF CECUM: ICD-10-CM

## 2021-12-13 DIAGNOSIS — R10.13 DYSPEPSIA: ICD-10-CM

## 2021-12-13 PROCEDURE — 99213 OFFICE O/P EST LOW 20 MIN: CPT | Performed by: INTERNAL MEDICINE

## 2021-12-13 RX ORDER — POLYETHYLENE GLYCOL 3350 17 G/17G
AS DIRECTED POWDER, FOR SOLUTION ORAL
Status: ACTIVE | COMMUNITY

## 2021-12-13 RX ORDER — LISINOPRIL AND HYDROCHLOROTHIAZIDE TABLETS 20; 12.5 MG/1; MG/1
TAKE 1 TABLET BY ORAL ROUTE ONCE DAILY TABLET ORAL 1
Qty: 0 | Refills: 0 | Status: ACTIVE | COMMUNITY
Start: 1900-01-01

## 2021-12-13 RX ORDER — STANDARDIZED SENNA CONCENTRATE 8.6 MG/1
2 TABLETS AT BEDTIME AS NEEDED TABLET ORAL ONCE A DAY
Status: ACTIVE | COMMUNITY

## 2021-12-13 RX ORDER — ATORVASTATIN CALCIUM 20 MG/1
TABLET, FILM COATED ORAL
Qty: 0 | Refills: 0 | Status: ACTIVE | COMMUNITY
Start: 1900-01-01

## 2021-12-13 RX ORDER — RIVAROXABAN 20 MG/1
TAKE 1 TABLET (20 MG) BY ORAL ROUTE ONCE DAILY WITH THE EVENING MEAL TABLET, FILM COATED ORAL 1
Qty: 0 | Refills: 0 | Status: ACTIVE | COMMUNITY
Start: 1900-01-01

## 2021-12-13 RX ORDER — PANTOPRAZOLE SODIUM 40 MG/1
TAKE 1 TABLET (40 MG) BY ORAL ROUTE ONCE DAILY FOR 30 DAYS TABLET, DELAYED RELEASE ORAL ONCE A DAY
Status: DISCONTINUED | COMMUNITY

## 2021-12-13 RX ORDER — STANDARDIZED SENNA CONCENTRATE 8.6 MG/1
2 TABLETS AT BEDTIME AS NEEDED TABLET ORAL ONCE A DAY
OUTPATIENT

## 2021-12-13 RX ORDER — DICLOFENAC SODIUM 10 MG/G
GEL TOPICAL
Qty: 0 | Refills: 0 | Status: ACTIVE | COMMUNITY
Start: 1900-01-01

## 2021-12-13 RX ORDER — INSULIN ASPART 100 [IU]/ML
AS DIRECTED INJECTION, SUSPENSION SUBCUTANEOUS
Status: ACTIVE | COMMUNITY

## 2021-12-13 RX ORDER — GABAPENTIN 300 MG/1
TAKE 1 CAPSULE (300 MG) BY ORAL ROUTE 3 TIMES PER DAY CAPSULE ORAL
Qty: 0 | Refills: 0 | Status: ACTIVE | COMMUNITY
Start: 1900-01-01

## 2021-12-13 RX ORDER — PANTOPRAZOLE SODIUM 40 MG/1
TAKE 1 TABLET (40 MG) BY ORAL ROUTE ONCE DAILY FOR 30 DAYS TABLET, DELAYED RELEASE ORAL ONCE A DAY
OUTPATIENT

## 2021-12-13 RX ORDER — ALPHA-D-GALACTOSIDASE 400 UNIT
1 TABLET AT BEDTIME TABLET ORAL ONCE A DAY
OUTPATIENT

## 2021-12-13 RX ORDER — CLOPIDOGREL BISULFATE 75 MG
TAKE 1 TABLET (75 MG) BY ORAL ROUTE ONCE DAILY TABLET ORAL 1
Qty: 0 | Refills: 0 | Status: ACTIVE | COMMUNITY
Start: 1900-01-01

## 2021-12-13 RX ORDER — POLYETHYLENE GLYCOL 3350 17 G/17G
AS DIRECTED POWDER, FOR SOLUTION ORAL
OUTPATIENT

## 2021-12-13 RX ORDER — ALPHA-D-GALACTOSIDASE 400 UNIT
1 TABLET AT BEDTIME TABLET ORAL ONCE A DAY
Status: ACTIVE | COMMUNITY

## 2021-12-13 NOTE — PHYSICAL EXAM CONSTITUTIONAL:
Overweight, well developed, well nourished , in no acute distress , ambulating without difficulty , normal communication ability no distress/well-groomed

## 2021-12-13 NOTE — HPI-TODAY'S VISIT:
Today November 18, 2020 the patient returns for a follow-up visit, the patient was last seen in the office on February 24, 2020 with generalized abdominal tenderness, constipation due to slow transit and outlet dysfunction, abdominal bloating, personal history of colonic polyps, chronic anticoagulation, peripheral vascular disease, history of prostate cancer, COPD, hypertension, asthma and a history of poliomyelitis. The patient had a colonoscopy 4 years before the visit and it showed several serrated adenomas.. The patient was advised to have a sits marker study, and an MR defecography and a colonoscopy. The patient had the MR defecogram on March 6, 2020 and it showed a small anterior rectocele, no evidence of rectal intussusception or dyssynergic defecation. The patient had the sits marker study and on March 11, 2022 frontal views of the abdomen where evaluated, on that particular day no definite markers were seen in the ascending and transverse colon.  There was one marker seen in the left colon.  There was a moderate amount of stool and gas seen throughout the colon, there was no evidence of bowel obstruction. The colonoscopy was not performed due to scheduling issues including COVID-19. The patient has been cleared by vascular surgery to hold anticoagulation. The patient continues to have severe constipation, along with a constipation he gets pain across the lower back and upper abdomen, he denies any nausea, vomiting, rectal bleeding or hematochezia. The patient will be scheduled to have the colonoscopy.  During a previous colonoscopy I did remove multiple colonic polyps, benefits potential complications and alternatives to colonoscopy have been disclosed. Today January 7, 2021 the patient returns for a follow-up visit, the patient was last seen in the office on November 18, 2020 with slow transit constipation, and rectocele, personal history of colonic polyps, bloating, GERD, history of prostate cancer, asthma, essential hypertension, COPD, peripheral vascular disease on chronic anticoagulation. At the time of the visit the patient complaint of severe constipation, along with a constipation he did get pain across the lower back and upper abdomen, the patient denied having any nausea, vomiting or rectal bleeding.  The patient was scheduled to have a colonoscopy, during the previous colonoscopy I did remove multiple colonic polyps.  The patient had a sitz marker study in March 2020 that showed a single marker in the left colon, there was a moderate amount of stool and gas throughout the colon with no evidence of obstruction.  An MR defecography showed a small anterior rectocele with no evidence of rectal intussusception or dyssynergic defecation. The colonoscopy was performed on December 17, 2020, multiple polyps were encountered, there was a 20 mm sessile cecal serrated adenomatous polyp which was removed with a hot snare, a 5 mm ascending colon sessile serrated adenomatous polyp removed with a cold snare, a 12 mm ascending colon semipedunculated adenomatous polyp removed with a hot snare, the patient required 1 hemostatic clip to prevent bleeding, there was a 5 mm sessile polyp removed from the transverse colon with a cold snare and a 3 mm rectal hyperplastic polyp removed from the rectum with cold biopsy forceps. Today the patient returns stating that he continues to have some difficulty expelling stool, slight constipation, in the past he did respond to MiraLAX 17 g and he has been advised to restart MiraLAX 17 g either daily or every other day. The patient denied having any abdominal pain but does complain of increasing flatulence which improves after defecation. The patient's gastroesophageal reflux remains a problem, the patient was instructed to follow antireflux measures and diet, the patient will start pantoprazole 40 mg daily. I discussed at length with the sitz marker study and the MR defecogram, the patient is not aware that he does have a small rectocele. The patient has been advised to have a surveillance colonoscopy in 6 months, he did have several serrated adenomatous polyps, and in the past multiple polyps had been removed. The patient will return for a follow-up visit in 6 months and will be scheduled for the colonoscopy at the time. Today August 18, 2021 the patient returns for a follow-up visit, the patient was last seen in the office on January 7, 2021 with a personal history of colonic polyps, constipation and a rectocele, gastroesophageal reflux disease, bloating, history of prostate cancer, asthma, COPD, hypertension, peripheral vascular disease on chronic anticoagulation. At the time of the last visit the patient returned stating that he had some difficulty expelling stool, the patient had slight constipation, in the past did not respond to treatment with MiraLAX 17 g and was advised to take the MiraLAX either every day or every other day. The patient denied having any abdominal pain, complains of increasing flatulence which improved after defecation. The patient's gastroesophageal reflux remains a problem, the patient was instructed to follow antireflux measures and diet, the patient was advised to take pantoprazole 40 mg daily. At the time of the visit I did discuss at length the findings of the transit time with markers and MR defecogram, the patient was made aware that he had a rectocele. The patient was advised to have a surveillance colonoscopy in 6 months.  In the past he had several serrated adenomatous polyps as well as older types of polyps.  Today the patient returns to the office stating that while taking MiraLAX for several months he did well, subsequently he stopped taking the MiraLAX, after 6 to 8 weeks he again developed constipation so he replaced the MiraLAX with Senokot, currently he has 1 or 2 soft bowel movements per day, before BMs the patient develops abdominal bloating and some flatulence which improved after defecation.  The patient denied any rectal bleeding or hematochezia.  The patient's gastroesophageal reflux remains under control with pantoprazole 40 mg daily.  The patient is due to have a surveillance colonoscopy, at the time of the previous colonoscopy he had multiple adenomatous and serrated polyps.  The patient will be scheduled for the colonoscopy.  Benefits potential complications and alternatives to colonoscopy were disclosed.  Today October 20, 2021 the patient returns for a follow-up visit, the patient was last seen in the office on August 18, 2021 with a personal history of colonic polyps, constipation, rectocele, GERD, bloating, history of prostate cancer, asthma, hypertension, COPD, peripheral vascular disease on chronic anticoagulation.  At the time of the last visit the patient returns stating that while taking MiraLAX for several months he did well, subsequently he stopped taking the MiraLAX and 6 to 8 weeks later he developed constipation, he replaced the MiraLAX with Senokot and was having 1-2 soft bowel movements per day, prior to bowel movements he developed abdominal bloating and some flatulence, all improved after defecation.  The patient denied having any rectal bleeding or hematochezia.  The gastroesophageal reflux remains under control with pantoprazole 40 mg daily.  The patient was advised to get a surveillance colonoscopy.  At the time of the previous colonoscopy he had multiple colonic polyps.  The colonoscopy was performed on October 5, 2021, at the time he was found to have perianal skin tags, 7 hyperplastic polyps were removed from the rectum measuring between 2 and 3 mm, there was a 5 mm ascending adenomatous colon polyp which was sessile and removed with a cold snare, a 3 mm adenomatous polyp removed from the transverse colon he was also sessile and a 3 mm cecal hyperplastic polyp removed with cold biopsy forceps.  The patient will be advised to have a follow-up surveillance colonoscopy in 1 year.  Today the patient returns to the office stating that he is mostly doing well, his main complaint at this point in time is no longer constipation or abdominal bloating, he seems to be defecating daily ever since he had the colonoscopy.  The patient does complain of gastroesophageal reflux, he has mostly acid reflux in the evening when he lies down and has agreed to elevate the head of his bed.  The patient did not take the pantoprazole as instructed and is taking an over-the-counter omeprazole tablet with only relative improvement.  I discussed at length with the patient antireflux measures and diet as well as the use of the medication and the elevation of the head of the bed.  The patient will try it for the next 60 days if not improved he agreed to have an EGD.  The patient will return for a follow-up visit in 60 days.  The patient is to have a surveillance colonoscopy in 1 year.  I discussed at length with the patient the recent colonoscopy and pathology reports.  Today Decmber 13 2021 the patient returns for a follow-up visit, the patient was last seen on October 20, 2021 with adenomatous colonic polyps hyperplastic colonic polyps, past history of colonic polyps, constipation, rectocele, gastroesophageal reflux disease, bloating, history of prostate cancer, asthma, COPD, hypertension, peripheral vascular disease on anticoagulation.  At the time of the last visit the patient appeared to be doing well, the patient's main complaint was gastroesophageal reflux, the patient was having reflux in the evening when lying down, the patient agreed to elevate the head of the bed, the patient had not been compliant on the ingestion of pantoprazole as instructed, he was taking over-the-counter omeprazole with only relative improvement.  At the time we discussed antireflux measures and diet and the use of the medications as well as elevation of the head of the bed. The patient agreed to try all the above for 60 days as instructed and if not improved he would be scheduled to have an EGD. The patient was also advised to get a surveillance colonoscopy in 1 year.  Today the patient returns to the office stating that once you run out of pantoprazole which she was taking twice a day he replaced it with Tagamet 200 mg which he takes once a day and no longer has any heartburn.  The patient claims that all that he has a slow gastric emptying and that he feels up quickly, and has dyspepsia.  The patient agreed to have a nuclear gastric emptying study.  He will remain on Tagamet 200 mg daily which she gets over-the-counter.  The patient will return for a follow-up visit after completion of testing.

## 2022-03-31 ENCOUNTER — LAB OUTSIDE AN ENCOUNTER (OUTPATIENT)
Dept: URBAN - METROPOLITAN AREA CLINIC 74 | Facility: CLINIC | Age: 72
End: 2022-03-31

## 2022-03-31 ENCOUNTER — OFFICE VISIT (OUTPATIENT)
Dept: URBAN - METROPOLITAN AREA CLINIC 74 | Facility: CLINIC | Age: 72
End: 2022-03-31
Payer: MEDICARE

## 2022-03-31 VITALS
HEART RATE: 73 BPM | TEMPERATURE: 98.2 F | DIASTOLIC BLOOD PRESSURE: 70 MMHG | HEIGHT: 73 IN | WEIGHT: 189.2 LBS | BODY MASS INDEX: 25.08 KG/M2 | OXYGEN SATURATION: 96 % | SYSTOLIC BLOOD PRESSURE: 142 MMHG

## 2022-03-31 DIAGNOSIS — D12.2 ADENOMATOUS POLYP OF ASCENDING COLON: ICD-10-CM

## 2022-03-31 DIAGNOSIS — R68.81 EARLY SATIETY: ICD-10-CM

## 2022-03-31 DIAGNOSIS — K59.01 CONSTIPATION, SLOW TRANSIT: ICD-10-CM

## 2022-03-31 DIAGNOSIS — D12.3 ADENOMATOUS POLYP OF TRANSVERSE COLON: ICD-10-CM

## 2022-03-31 DIAGNOSIS — K21.9 GERD (GASTROESOPHAGEAL REFLUX DISEASE): ICD-10-CM

## 2022-03-31 DIAGNOSIS — R10.13 DYSPEPSIA: ICD-10-CM

## 2022-03-31 DIAGNOSIS — R14.0 BLOATING: ICD-10-CM

## 2022-03-31 DIAGNOSIS — I73.9 PERIPHERAL VASCULAR DISEASE: ICD-10-CM

## 2022-03-31 DIAGNOSIS — N81.6 RECTOCELE: ICD-10-CM

## 2022-03-31 DIAGNOSIS — K62.1 HYPERPLASTIC RECTAL POLYP: ICD-10-CM

## 2022-03-31 DIAGNOSIS — K63.5 HYPERPLASTIC POLYP OF CECUM: ICD-10-CM

## 2022-03-31 PROCEDURE — 99213 OFFICE O/P EST LOW 20 MIN: CPT | Performed by: INTERNAL MEDICINE

## 2022-03-31 RX ORDER — STANDARDIZED SENNA CONCENTRATE 8.6 MG/1
2 TABLETS AT BEDTIME AS NEEDED TABLET ORAL ONCE A DAY
OUTPATIENT

## 2022-03-31 RX ORDER — DICLOFENAC SODIUM 10 MG/G
GEL TOPICAL
Qty: 0 | Refills: 0 | Status: ACTIVE | COMMUNITY
Start: 1900-01-01

## 2022-03-31 RX ORDER — ALPHA-D-GALACTOSIDASE 400 UNIT
1 TABLET AT BEDTIME TABLET ORAL ONCE A DAY
Status: ACTIVE | COMMUNITY

## 2022-03-31 RX ORDER — ATORVASTATIN CALCIUM 20 MG/1
TABLET, FILM COATED ORAL
Qty: 0 | Refills: 0 | Status: ACTIVE | COMMUNITY
Start: 1900-01-01

## 2022-03-31 RX ORDER — INSULIN ASPART 100 [IU]/ML
AS DIRECTED INJECTION, SUSPENSION SUBCUTANEOUS
Status: ACTIVE | COMMUNITY

## 2022-03-31 RX ORDER — ALPHA-D-GALACTOSIDASE 400 UNIT
1 TABLET AT BEDTIME TABLET ORAL ONCE A DAY
OUTPATIENT

## 2022-03-31 RX ORDER — POLYETHYLENE GLYCOL 3350 17 G/17G
AS DIRECTED POWDER, FOR SOLUTION ORAL
Status: ACTIVE | COMMUNITY

## 2022-03-31 RX ORDER — CLOPIDOGREL BISULFATE 75 MG
TAKE 1 TABLET (75 MG) BY ORAL ROUTE ONCE DAILY TABLET ORAL 1
Qty: 0 | Refills: 0 | Status: ACTIVE | COMMUNITY
Start: 1900-01-01

## 2022-03-31 RX ORDER — RIVAROXABAN 20 MG/1
TAKE 1 TABLET (20 MG) BY ORAL ROUTE ONCE DAILY WITH THE EVENING MEAL TABLET, FILM COATED ORAL 1
Qty: 0 | Refills: 0 | Status: ACTIVE | COMMUNITY
Start: 1900-01-01

## 2022-03-31 RX ORDER — GABAPENTIN 300 MG/1
TAKE 1 CAPSULE (300 MG) BY ORAL ROUTE 3 TIMES PER DAY CAPSULE ORAL
Qty: 0 | Refills: 0 | Status: ACTIVE | COMMUNITY
Start: 1900-01-01

## 2022-03-31 RX ORDER — POLYETHYLENE GLYCOL 3350 17 G/17G
AS DIRECTED POWDER, FOR SOLUTION ORAL
OUTPATIENT

## 2022-03-31 RX ORDER — STANDARDIZED SENNA CONCENTRATE 8.6 MG/1
2 TABLETS AT BEDTIME AS NEEDED TABLET ORAL ONCE A DAY
Status: ACTIVE | COMMUNITY

## 2022-03-31 RX ORDER — LISINOPRIL AND HYDROCHLOROTHIAZIDE TABLETS 20; 12.5 MG/1; MG/1
TAKE 1 TABLET BY ORAL ROUTE ONCE DAILY TABLET ORAL 1
Qty: 0 | Refills: 0 | Status: ACTIVE | COMMUNITY
Start: 1900-01-01

## 2022-03-31 NOTE — HPI-TODAY'S VISIT:
Today November 18, 2020 the patient returns for a follow-up visit, the patient was last seen in the office on February 24, 2020 with generalized abdominal tenderness, constipation due to slow transit and outlet dysfunction, abdominal bloating, personal history of colonic polyps, chronic anticoagulation, peripheral vascular disease, history of prostate cancer, COPD, hypertension, asthma and a history of poliomyelitis. The patient had a colonoscopy 4 years before the visit and it showed several serrated adenomas.. The patient was advised to have a sits marker study, and an MR defecography and a colonoscopy. The patient had the MR defecogram on March 6, 2020 and it showed a small anterior rectocele, no evidence of rectal intussusception or dyssynergic defecation. The patient had the sits marker study and on March 11, 2022 frontal views of the abdomen where evaluated, on that particular day no definite markers were seen in the ascending and transverse colon.  There was one marker seen in the left colon.  There was a moderate amount of stool and gas seen throughout the colon, there was no evidence of bowel obstruction. The colonoscopy was not performed due to scheduling issues including COVID-19. The patient has been cleared by vascular surgery to hold anticoagulation. The patient continues to have severe constipation, along with a constipation he gets pain across the lower back and upper abdomen, he denies any nausea, vomiting, rectal bleeding or hematochezia. The patient will be scheduled to have the colonoscopy.  During a previous colonoscopy I did remove multiple colonic polyps, benefits potential complications and alternatives to colonoscopy have been disclosed. Today January 7, 2021 the patient returns for a follow-up visit, the patient was last seen in the office on November 18, 2020 with slow transit constipation, and rectocele, personal history of colonic polyps, bloating, GERD, history of prostate cancer, asthma, essential hypertension, COPD, peripheral vascular disease on chronic anticoagulation. At the time of the visit the patient complaint of severe constipation, along with a constipation he did get pain across the lower back and upper abdomen, the patient denied having any nausea, vomiting or rectal bleeding.  The patient was scheduled to have a colonoscopy, during the previous colonoscopy I did remove multiple colonic polyps.  The patient had a sitz marker study in March 2020 that showed a single marker in the left colon, there was a moderate amount of stool and gas throughout the colon with no evidence of obstruction.  An MR defecography showed a small anterior rectocele with no evidence of rectal intussusception or dyssynergic defecation. The colonoscopy was performed on December 17, 2020, multiple polyps were encountered, there was a 20 mm sessile cecal serrated adenomatous polyp which was removed with a hot snare, a 5 mm ascending colon sessile serrated adenomatous polyp removed with a cold snare, a 12 mm ascending colon semipedunculated adenomatous polyp removed with a hot snare, the patient required 1 hemostatic clip to prevent bleeding, there was a 5 mm sessile polyp removed from the transverse colon with a cold snare and a 3 mm rectal hyperplastic polyp removed from the rectum with cold biopsy forceps. Today the patient returns stating that he continues to have some difficulty expelling stool, slight constipation, in the past he did respond to MiraLAX 17 g and he has been advised to restart MiraLAX 17 g either daily or every other day. The patient denied having any abdominal pain but does complain of increasing flatulence which improves after defecation. The patient's gastroesophageal reflux remains a problem, the patient was instructed to follow antireflux measures and diet, the patient will start pantoprazole 40 mg daily. I discussed at length with the sitz marker study and the MR defecogram, the patient is not aware that he does have a small rectocele. The patient has been advised to have a surveillance colonoscopy in 6 months, he did have several serrated adenomatous polyps, and in the past multiple polyps had been removed. The patient will return for a follow-up visit in 6 months and will be scheduled for the colonoscopy at the time. Today August 18, 2021 the patient returns for a follow-up visit, the patient was last seen in the office on January 7, 2021 with a personal history of colonic polyps, constipation and a rectocele, gastroesophageal reflux disease, bloating, history of prostate cancer, asthma, COPD, hypertension, peripheral vascular disease on chronic anticoagulation. At the time of the last visit the patient returned stating that he had some difficulty expelling stool, the patient had slight constipation, in the past did not respond to treatment with MiraLAX 17 g and was advised to take the MiraLAX either every day or every other day. The patient denied having any abdominal pain, complains of increasing flatulence which improved after defecation. The patient's gastroesophageal reflux remains a problem, the patient was instructed to follow antireflux measures and diet, the patient was advised to take pantoprazole 40 mg daily. At the time of the visit I did discuss at length the findings of the transit time with markers and MR defecogram, the patient was made aware that he had a rectocele. The patient was advised to have a surveillance colonoscopy in 6 months.  In the past he had several serrated adenomatous polyps as well as older types of polyps.  Today the patient returns to the office stating that while taking MiraLAX for several months he did well, subsequently he stopped taking the MiraLAX, after 6 to 8 weeks he again developed constipation so he replaced the MiraLAX with Senokot, currently he has 1 or 2 soft bowel movements per day, before BMs the patient develops abdominal bloating and some flatulence which improved after defecation.  The patient denied any rectal bleeding or hematochezia.  The patient's gastroesophageal reflux remains under control with pantoprazole 40 mg daily.  The patient is due to have a surveillance colonoscopy, at the time of the previous colonoscopy he had multiple adenomatous and serrated polyps.  The patient will be scheduled for the colonoscopy.  Benefits potential complications and alternatives to colonoscopy were disclosed.  Today October 20, 2021 the patient returns for a follow-up visit, the patient was last seen in the office on August 18, 2021 with a personal history of colonic polyps, constipation, rectocele, GERD, bloating, history of prostate cancer, asthma, hypertension, COPD, peripheral vascular disease on chronic anticoagulation.  At the time of the last visit the patient returns stating that while taking MiraLAX for several months he did well, subsequently he stopped taking the MiraLAX and 6 to 8 weeks later he developed constipation, he replaced the MiraLAX with Senokot and was having 1-2 soft bowel movements per day, prior to bowel movements he developed abdominal bloating and some flatulence, all improved after defecation.  The patient denied having any rectal bleeding or hematochezia.  The gastroesophageal reflux remains under control with pantoprazole 40 mg daily.  The patient was advised to get a surveillance colonoscopy.  At the time of the previous colonoscopy he had multiple colonic polyps.  The colonoscopy was performed on October 5, 2021, at the time he was found to have perianal skin tags, 7 hyperplastic polyps were removed from the rectum measuring between 2 and 3 mm, there was a 5 mm ascending adenomatous colon polyp which was sessile and removed with a cold snare, a 3 mm adenomatous polyp removed from the transverse colon he was also sessile and a 3 mm cecal hyperplastic polyp removed with cold biopsy forceps.  The patient will be advised to have a follow-up surveillance colonoscopy in 1 year.  Today the patient returns to the office stating that he is mostly doing well, his main complaint at this point in time is no longer constipation or abdominal bloating, he seems to be defecating daily ever since he had the colonoscopy.  The patient does complain of gastroesophageal reflux, he has mostly acid reflux in the evening when he lies down and has agreed to elevate the head of his bed.  The patient did not take the pantoprazole as instructed and is taking an over-the-counter omeprazole tablet with only relative improvement.  I discussed at length with the patient antireflux measures and diet as well as the use of the medication and the elevation of the head of the bed.  The patient will try it for the next 60 days if not improved he agreed to have an EGD.  The patient will return for a follow-up visit in 60 days.  The patient is to have a surveillance colonoscopy in 1 year.  I discussed at length with the patient the recent colonoscopy and pathology reports.  Today Decmber 13 2021 the patient returns for a follow-up visit, the patient was last seen on October 20, 2021 with adenomatous colonic polyps hyperplastic colonic polyps, past history of colonic polyps, constipation, rectocele, gastroesophageal reflux disease, bloating, history of prostate cancer, asthma, COPD, hypertension, peripheral vascular disease on anticoagulation.  At the time of the last visit the patient appeared to be doing well, the patient's main complaint was gastroesophageal reflux, the patient was having reflux in the evening when lying down, the patient agreed to elevate the head of the bed, the patient had not been compliant on the ingestion of pantoprazole as instructed, he was taking over-the-counter omeprazole with only relative improvement.  At the time we discussed antireflux measures and diet and the use of the medications as well as elevation of the head of the bed. The patient agreed to try all the above for 60 days as instructed and if not improved he would be scheduled to have an EGD. The patient was also advised to get a surveillance colonoscopy in 1 year.  Today the patient returns to the office stating that once you run out of pantoprazole which she was taking twice a day he replaced it with Tagamet 200 mg which he takes once a day and no longer has any heartburn.  The patient claims that all that he has a slow gastric emptying and that he feels up quickly, and has dyspepsia.  The patient agreed to have a nuclear gastric emptying study.  He will remain on Tagamet 200 mg daily which she gets over-the-counter.  The patient will return for a follow-up visit after completion of testing.  Today March 31, 2022 the patient returns for a follow-up visit, the patient was last seen in the office on December 13, 2021 with dyspepsia, early satiety, GERD, bloating, adenomatous colonic polyps and hyperplastic colonic polyps constipation due to slow transit, rectocele, history of prostate cancer, asthma, hypertension, COPD, peripheral vascular disease on chronic anticoagulation.  At the time of the last visit the patient appeared to be doing well, the patient's main complaint was gastroesophageal reflux, the patient was having reflux in the evening when lying down, the patient agreed to elevate the head of the bed, follow antireflux measures and diet and take pantoprazole 40 mg daily.  The patient agreed to have an EGD if not improved.  At the time of the visit the patient stated that he had ran out of pantoprazole which she was taking twice daily and replaced it with Tagamet 200 mg once daily and no longer had any heartburn.  The patient complained of a slow gastric emptying and complained of dyspepsia along with early satiety.  The patient agreed to have a gastric emptying study and would remain on Tagamet 200 mg daily.  The patient had a nuclear gastric emptying study on January 24, 2022 which was normal at 2 hours.  The patient's last colonoscopy performed on October 5, 2021 revealed multiple colonic polyps, at the time the patient was advised to repeat the colonoscopy in October 2022.  Today the patient returns to the office stating that he continues to have episodes of upper abdominal bloating, early satiety.  We discussed the recent nuclear gastric emptying study which was reported normal.  The patient denies having any acid reflux, dysphagia, odynophagia, there has been no nausea or vomiting.  It seems that the patient's glucose bounces up and down and he has not been able to control his glucose well and has difficulty controlling his diet.  The patient has been recommended to follow a gastroparesis diet with 3 smaller meals and a couple snacks in between but she has also been recommended to consult with the nutritionist for further advice.  For the time being the patient will remain on Tagamet as previously prescribed, currently he is taking no laxatives and has been able to defecate every 2 to 3 days soft formed stool with no blood.  The patient will return for a follow-up visit in 3 months and will be then scheduled to have a surveillance colonoscopy to be performed October 2022.

## 2022-04-07 ENCOUNTER — OFFICE VISIT (OUTPATIENT)
Dept: URBAN - METROPOLITAN AREA TELEHEALTH 2 | Facility: TELEHEALTH | Age: 72
End: 2022-04-07
Payer: MEDICARE

## 2022-04-07 ENCOUNTER — OFFICE VISIT (OUTPATIENT)
Dept: URBAN - METROPOLITAN AREA TELEHEALTH 2 | Facility: TELEHEALTH | Age: 72
End: 2022-04-07

## 2022-04-07 DIAGNOSIS — R14.0 ABDOMINAL BLOATING: ICD-10-CM

## 2022-04-07 DIAGNOSIS — K21.9 ACID REFLUX: ICD-10-CM

## 2022-04-07 PROCEDURE — 97802 MEDICAL NUTRITION INDIV IN: CPT | Performed by: DIETITIAN, REGISTERED

## 2022-04-07 RX ORDER — POLYETHYLENE GLYCOL 3350 17 G/17G
AS DIRECTED POWDER, FOR SOLUTION ORAL
Status: ACTIVE | COMMUNITY

## 2022-04-07 RX ORDER — ALPHA-D-GALACTOSIDASE 400 UNIT
1 TABLET AT BEDTIME TABLET ORAL ONCE A DAY
Status: ACTIVE | COMMUNITY

## 2022-04-07 RX ORDER — INSULIN ASPART 100 [IU]/ML
AS DIRECTED INJECTION, SUSPENSION SUBCUTANEOUS
Status: ACTIVE | COMMUNITY

## 2022-04-07 RX ORDER — RIVAROXABAN 20 MG/1
TAKE 1 TABLET (20 MG) BY ORAL ROUTE ONCE DAILY WITH THE EVENING MEAL TABLET, FILM COATED ORAL 1
Qty: 0 | Refills: 0 | Status: ACTIVE | COMMUNITY
Start: 1900-01-01

## 2022-04-07 RX ORDER — CLOPIDOGREL BISULFATE 75 MG
TAKE 1 TABLET (75 MG) BY ORAL ROUTE ONCE DAILY TABLET ORAL 1
Qty: 0 | Refills: 0 | Status: ACTIVE | COMMUNITY
Start: 1900-01-01

## 2022-04-07 RX ORDER — STANDARDIZED SENNA CONCENTRATE 8.6 MG/1
2 TABLETS AT BEDTIME AS NEEDED TABLET ORAL ONCE A DAY
Status: ACTIVE | COMMUNITY

## 2022-04-07 RX ORDER — DICLOFENAC SODIUM 10 MG/G
GEL TOPICAL
Qty: 0 | Refills: 0 | Status: ACTIVE | COMMUNITY
Start: 1900-01-01

## 2022-04-07 RX ORDER — ATORVASTATIN CALCIUM 20 MG/1
TABLET, FILM COATED ORAL
Qty: 0 | Refills: 0 | Status: ACTIVE | COMMUNITY
Start: 1900-01-01

## 2022-04-07 RX ORDER — LISINOPRIL AND HYDROCHLOROTHIAZIDE TABLETS 20; 12.5 MG/1; MG/1
TAKE 1 TABLET BY ORAL ROUTE ONCE DAILY TABLET ORAL 1
Qty: 0 | Refills: 0 | Status: ACTIVE | COMMUNITY
Start: 1900-01-01

## 2022-04-07 RX ORDER — GABAPENTIN 300 MG/1
TAKE 1 CAPSULE (300 MG) BY ORAL ROUTE 3 TIMES PER DAY CAPSULE ORAL
Qty: 0 | Refills: 0 | Status: ACTIVE | COMMUNITY
Start: 1900-01-01

## 2022-06-28 PROBLEM — 428283002: Status: ACTIVE | Noted: 2020-11-17

## 2022-06-30 ENCOUNTER — CLAIMS CREATED FROM THE CLAIM WINDOW (OUTPATIENT)
Dept: URBAN - METROPOLITAN AREA CLINIC 74 | Facility: CLINIC | Age: 72
End: 2022-06-30
Payer: MEDICARE

## 2022-06-30 VITALS
HEIGHT: 73 IN | WEIGHT: 182 LBS | SYSTOLIC BLOOD PRESSURE: 140 MMHG | TEMPERATURE: 98.1 F | BODY MASS INDEX: 24.12 KG/M2 | OXYGEN SATURATION: 95 % | DIASTOLIC BLOOD PRESSURE: 80 MMHG | HEART RATE: 78 BPM

## 2022-06-30 DIAGNOSIS — R14.0 BLOATING: ICD-10-CM

## 2022-06-30 DIAGNOSIS — I10 ESSENTIAL HYPERTENSION: ICD-10-CM

## 2022-06-30 DIAGNOSIS — K21.9 GERD (GASTROESOPHAGEAL REFLUX DISEASE): ICD-10-CM

## 2022-06-30 DIAGNOSIS — K59.01 CONSTIPATION, SLOW TRANSIT: ICD-10-CM

## 2022-06-30 DIAGNOSIS — K62.1 HYPERPLASTIC RECTAL POLYP: ICD-10-CM

## 2022-06-30 DIAGNOSIS — D12.2 ADENOMATOUS POLYP OF ASCENDING COLON: ICD-10-CM

## 2022-06-30 DIAGNOSIS — Z85.46 HISTORY OF PROSTATE CANCER: ICD-10-CM

## 2022-06-30 DIAGNOSIS — Z86.010 PERSONAL HISTORY OF COLONIC POLYPS: ICD-10-CM

## 2022-06-30 DIAGNOSIS — R68.81 EARLY SATIETY: ICD-10-CM

## 2022-06-30 DIAGNOSIS — R10.13 DYSPEPSIA: ICD-10-CM

## 2022-06-30 DIAGNOSIS — I73.9 PERIPHERAL VASCULAR DISEASE: ICD-10-CM

## 2022-06-30 DIAGNOSIS — K63.5 HYPERPLASTIC POLYP OF CECUM: ICD-10-CM

## 2022-06-30 DIAGNOSIS — J45.909 ASTHMA: ICD-10-CM

## 2022-06-30 DIAGNOSIS — E13.9 DIABETES 1.5, MANAGED AS TYPE 2: ICD-10-CM

## 2022-06-30 DIAGNOSIS — Z79.01 CHRONIC ANTICOAGULATION: ICD-10-CM

## 2022-06-30 DIAGNOSIS — N81.6 RECTOCELE: ICD-10-CM

## 2022-06-30 DIAGNOSIS — D12.3 ADENOMATOUS POLYP OF TRANSVERSE COLON: ICD-10-CM

## 2022-06-30 DIAGNOSIS — J44.9 COPD (CHRONIC OBSTRUCTIVE PULMONARY DISEASE): ICD-10-CM

## 2022-06-30 PROCEDURE — 99213 OFFICE O/P EST LOW 20 MIN: CPT | Performed by: INTERNAL MEDICINE

## 2022-06-30 RX ORDER — LISINOPRIL AND HYDROCHLOROTHIAZIDE TABLETS 20; 12.5 MG/1; MG/1
TAKE 1 TABLET BY ORAL ROUTE ONCE DAILY TABLET ORAL 1
Qty: 0 | Refills: 0 | Status: ACTIVE | COMMUNITY
Start: 1900-01-01

## 2022-06-30 RX ORDER — POLYETHYLENE GLYCOL 3350 17 G/17G
AS DIRECTED POWDER, FOR SOLUTION ORAL
Status: ACTIVE | COMMUNITY

## 2022-06-30 RX ORDER — CLOPIDOGREL BISULFATE 75 MG
TAKE 1 TABLET (75 MG) BY ORAL ROUTE ONCE DAILY TABLET ORAL 1
Qty: 0 | Refills: 0 | Status: ACTIVE | COMMUNITY
Start: 1900-01-01

## 2022-06-30 RX ORDER — STANDARDIZED SENNA CONCENTRATE 8.6 MG/1
2 TABLETS AT BEDTIME AS NEEDED TABLET ORAL ONCE A DAY
OUTPATIENT

## 2022-06-30 RX ORDER — GABAPENTIN 300 MG/1
TAKE 1 CAPSULE (300 MG) BY ORAL ROUTE 3 TIMES PER DAY CAPSULE ORAL
Qty: 0 | Refills: 0 | Status: ACTIVE | COMMUNITY
Start: 1900-01-01

## 2022-06-30 RX ORDER — ALPHA-D-GALACTOSIDASE 400 UNIT
1 TABLET AT BEDTIME TABLET ORAL ONCE A DAY
OUTPATIENT

## 2022-06-30 RX ORDER — ALPHA-D-GALACTOSIDASE 400 UNIT
1 TABLET AT BEDTIME TABLET ORAL ONCE A DAY
Status: ACTIVE | COMMUNITY

## 2022-06-30 RX ORDER — STANDARDIZED SENNA CONCENTRATE 8.6 MG/1
2 TABLETS AT BEDTIME AS NEEDED TABLET ORAL ONCE A DAY
Status: ACTIVE | COMMUNITY

## 2022-06-30 RX ORDER — DICLOFENAC SODIUM 10 MG/G
GEL TOPICAL
Qty: 0 | Refills: 0 | Status: ACTIVE | COMMUNITY
Start: 1900-01-01

## 2022-06-30 RX ORDER — INSULIN ASPART 100 [IU]/ML
AS DIRECTED INJECTION, SUSPENSION SUBCUTANEOUS
Status: ACTIVE | COMMUNITY

## 2022-06-30 RX ORDER — RIVAROXABAN 20 MG/1
TAKE 1 TABLET (20 MG) BY ORAL ROUTE ONCE DAILY WITH THE EVENING MEAL TABLET, FILM COATED ORAL 1
Qty: 0 | Refills: 0 | Status: ACTIVE | COMMUNITY
Start: 1900-01-01

## 2022-06-30 RX ORDER — ATORVASTATIN CALCIUM 20 MG/1
TABLET, FILM COATED ORAL
Qty: 0 | Refills: 0 | Status: ACTIVE | COMMUNITY
Start: 1900-01-01

## 2022-06-30 RX ORDER — POLYETHYLENE GLYCOL 3350 17 G/17G
AS DIRECTED POWDER, FOR SOLUTION ORAL
OUTPATIENT

## 2022-06-30 NOTE — HPI-TODAY'S VISIT:
Today November 18, 2020 the patient returns for a follow-up visit, the patient was last seen in the office on February 24, 2020 with generalized abdominal tenderness, constipation due to slow transit and outlet dysfunction, abdominal bloating, personal history of colonic polyps, chronic anticoagulation, peripheral vascular disease, history of prostate cancer, COPD, hypertension, asthma and a history of poliomyelitis. The patient had a colonoscopy 4 years before the visit and it showed several serrated adenomas.. The patient was advised to have a sits marker study, and an MR defecography and a colonoscopy. The patient had the MR defecogram on March 6, 2020 and it showed a small anterior rectocele, no evidence of rectal intussusception or dyssynergic defecation. The patient had the sits marker study and on March 11, 2022 frontal views of the abdomen where evaluated, on that particular day no definite markers were seen in the ascending and transverse colon.  There was one marker seen in the left colon.  There was a moderate amount of stool and gas seen throughout the colon, there was no evidence of bowel obstruction. The colonoscopy was not performed due to scheduling issues including COVID-19. The patient has been cleared by vascular surgery to hold anticoagulation. The patient continues to have severe constipation, along with a constipation he gets pain across the lower back and upper abdomen, he denies any nausea, vomiting, rectal bleeding or hematochezia. The patient will be scheduled to have the colonoscopy.  During a previous colonoscopy I did remove multiple colonic polyps, benefits potential complications and alternatives to colonoscopy have been disclosed. Today January 7, 2021 the patient returns for a follow-up visit, the patient was last seen in the office on November 18, 2020 with slow transit constipation, and rectocele, personal history of colonic polyps, bloating, GERD, history of prostate cancer, asthma, essential hypertension, COPD, peripheral vascular disease on chronic anticoagulation. At the time of the visit the patient complaint of severe constipation, along with a constipation he did get pain across the lower back and upper abdomen, the patient denied having any nausea, vomiting or rectal bleeding.  The patient was scheduled to have a colonoscopy, during the previous colonoscopy I did remove multiple colonic polyps.  The patient had a sitz marker study in March 2020 that showed a single marker in the left colon, there was a moderate amount of stool and gas throughout the colon with no evidence of obstruction.  An MR defecography showed a small anterior rectocele with no evidence of rectal intussusception or dyssynergic defecation. The colonoscopy was performed on December 17, 2020, multiple polyps were encountered, there was a 20 mm sessile cecal serrated adenomatous polyp which was removed with a hot snare, a 5 mm ascending colon sessile serrated adenomatous polyp removed with a cold snare, a 12 mm ascending colon semipedunculated adenomatous polyp removed with a hot snare, the patient required 1 hemostatic clip to prevent bleeding, there was a 5 mm sessile polyp removed from the transverse colon with a cold snare and a 3 mm rectal hyperplastic polyp removed from the rectum with cold biopsy forceps. Today the patient returns stating that he continues to have some difficulty expelling stool, slight constipation, in the past he did respond to MiraLAX 17 g and he has been advised to restart MiraLAX 17 g either daily or every other day. The patient denied having any abdominal pain but does complain of increasing flatulence which improves after defecation. The patient's gastroesophageal reflux remains a problem, the patient was instructed to follow antireflux measures and diet, the patient will start pantoprazole 40 mg daily. I discussed at length with the sitz marker study and the MR defecogram, the patient is not aware that he does have a small rectocele. The patient has been advised to have a surveillance colonoscopy in 6 months, he did have several serrated adenomatous polyps, and in the past multiple polyps had been removed. The patient will return for a follow-up visit in 6 months and will be scheduled for the colonoscopy at the time. Today August 18, 2021 the patient returns for a follow-up visit, the patient was last seen in the office on January 7, 2021 with a personal history of colonic polyps, constipation and a rectocele, gastroesophageal reflux disease, bloating, history of prostate cancer, asthma, COPD, hypertension, peripheral vascular disease on chronic anticoagulation. At the time of the last visit the patient returned stating that he had some difficulty expelling stool, the patient had slight constipation, in the past did not respond to treatment with MiraLAX 17 g and was advised to take the MiraLAX either every day or every other day. The patient denied having any abdominal pain, complains of increasing flatulence which improved after defecation. The patient's gastroesophageal reflux remains a problem, the patient was instructed to follow antireflux measures and diet, the patient was advised to take pantoprazole 40 mg daily. At the time of the visit I did discuss at length the findings of the transit time with markers and MR defecogram, the patient was made aware that he had a rectocele. The patient was advised to have a surveillance colonoscopy in 6 months.  In the past he had several serrated adenomatous polyps as well as older types of polyps.  Today the patient returns to the office stating that while taking MiraLAX for several months he did well, subsequently he stopped taking the MiraLAX, after 6 to 8 weeks he again developed constipation so he replaced the MiraLAX with Senokot, currently he has 1 or 2 soft bowel movements per day, before BMs the patient develops abdominal bloating and some flatulence which improved after defecation.  The patient denied any rectal bleeding or hematochezia.  The patient's gastroesophageal reflux remains under control with pantoprazole 40 mg daily.  The patient is due to have a surveillance colonoscopy, at the time of the previous colonoscopy he had multiple adenomatous and serrated polyps.  The patient will be scheduled for the colonoscopy.  Benefits potential complications and alternatives to colonoscopy were disclosed.  Today October 20, 2021 the patient returns for a follow-up visit, the patient was last seen in the office on August 18, 2021 with a personal history of colonic polyps, constipation, rectocele, GERD, bloating, history of prostate cancer, asthma, hypertension, COPD, peripheral vascular disease on chronic anticoagulation.  At the time of the last visit the patient returns stating that while taking MiraLAX for several months he did well, subsequently he stopped taking the MiraLAX and 6 to 8 weeks later he developed constipation, he replaced the MiraLAX with Senokot and was having 1-2 soft bowel movements per day, prior to bowel movements he developed abdominal bloating and some flatulence, all improved after defecation.  The patient denied having any rectal bleeding or hematochezia.  The gastroesophageal reflux remains under control with pantoprazole 40 mg daily.  The patient was advised to get a surveillance colonoscopy.  At the time of the previous colonoscopy he had multiple colonic polyps.  The colonoscopy was performed on October 5, 2021, at the time he was found to have perianal skin tags, 7 hyperplastic polyps were removed from the rectum measuring between 2 and 3 mm, there was a 5 mm ascending adenomatous colon polyp which was sessile and removed with a cold snare, a 3 mm adenomatous polyp removed from the transverse colon he was also sessile and a 3 mm cecal hyperplastic polyp removed with cold biopsy forceps.  The patient will be advised to have a follow-up surveillance colonoscopy in 1 year.  Today the patient returns to the office stating that he is mostly doing well, his main complaint at this point in time is no longer constipation or abdominal bloating, he seems to be defecating daily ever since he had the colonoscopy.  The patient does complain of gastroesophageal reflux, he has mostly acid reflux in the evening when he lies down and has agreed to elevate the head of his bed.  The patient did not take the pantoprazole as instructed and is taking an over-the-counter omeprazole tablet with only relative improvement.  I discussed at length with the patient antireflux measures and diet as well as the use of the medication and the elevation of the head of the bed.  The patient will try it for the next 60 days if not improved he agreed to have an EGD.  The patient will return for a follow-up visit in 60 days.  The patient is to have a surveillance colonoscopy in 1 year.  I discussed at length with the patient the recent colonoscopy and pathology reports.  Today Decmber 13 2021 the patient returns for a follow-up visit, the patient was last seen on October 20, 2021 with adenomatous colonic polyps hyperplastic colonic polyps, past history of colonic polyps, constipation, rectocele, gastroesophageal reflux disease, bloating, history of prostate cancer, asthma, COPD, hypertension, peripheral vascular disease on anticoagulation.  At the time of the last visit the patient appeared to be doing well, the patient's main complaint was gastroesophageal reflux, the patient was having reflux in the evening when lying down, the patient agreed to elevate the head of the bed, the patient had not been compliant on the ingestion of pantoprazole as instructed, he was taking over-the-counter omeprazole with only relative improvement.  At the time we discussed antireflux measures and diet and the use of the medications as well as elevation of the head of the bed. The patient agreed to try all the above for 60 days as instructed and if not improved he would be scheduled to have an EGD. The patient was also advised to get a surveillance colonoscopy in 1 year.  Today the patient returns to the office stating that once you run out of pantoprazole which she was taking twice a day he replaced it with Tagamet 200 mg which he takes once a day and no longer has any heartburn.  The patient claims that all that he has a slow gastric emptying and that he feels up quickly, and has dyspepsia.  The patient agreed to have a nuclear gastric emptying study.  He will remain on Tagamet 200 mg daily which she gets over-the-counter.  The patient will return for a follow-up visit after completion of testing.  Today March 31, 2022 the patient returns for a follow-up visit, the patient was last seen in the office on December 13, 2021 with dyspepsia, early satiety, GERD, bloating, adenomatous colonic polyps and hyperplastic colonic polyps constipation due to slow transit, rectocele, history of prostate cancer, asthma, hypertension, COPD, peripheral vascular disease on chronic anticoagulation.  At the time of the last visit the patient appeared to be doing well, the patient's main complaint was gastroesophageal reflux, the patient was having reflux in the evening when lying down, the patient agreed to elevate the head of the bed, follow antireflux measures and diet and take pantoprazole 40 mg daily.  The patient agreed to have an EGD if not improved.  At the time of the visit the patient stated that he had ran out of pantoprazole which she was taking twice daily and replaced it with Tagamet 200 mg once daily and no longer had any heartburn.  The patient complained of a slow gastric emptying and complained of dyspepsia along with early satiety.  The patient agreed to have a gastric emptying study and would remain on Tagamet 200 mg daily.  The patient had a nuclear gastric emptying study on January 24, 2022 which was normal at 2 hours.  The patient's last colonoscopy performed on October 5, 2021 revealed multiple colonic polyps, at the time the patient was advised to repeat the colonoscopy in October 2022.  Today the patient returns to the office stating that he continues to have episodes of upper abdominal bloating, early satiety.  We discussed the recent nuclear gastric emptying study which was reported normal.  The patient denies having any acid reflux, dysphagia, odynophagia, there has been no nausea or vomiting.  It seems that the patient's glucose bounces up and down and he has not been able to control his glucose well and has difficulty controlling his diet.  The patient has been recommended to follow a gastroparesis diet with 3 smaller meals and a couple snacks in between but she has also been recommended to consult with the nutritionist for further advice.  For the time being the patient will remain on Tagamet as previously prescribed, currently he is taking no laxatives and has been able to defecate every 2 to 3 days soft formed stool with no blood.  The patient will return for a follow-up visit in 3 months and will be then scheduled to have a surveillance colonoscopy to be performed October 2022.  Today June 30, 2022 the patient returns for a follow-up visit, the patient was last seen on March 31, 2022 with dyspepsia, early satiety, GERD, bloating, personal history of adenomatous and hyperplastic colonic polyps, constipation with a rectocele, history of prostate cancer, history of asthma hypertension and COPD, peripheral vascular disease on chronic anticoagulation and type 2 diabetes mellitus.  At the time of the last visit the patient did complain of episodes of upper abdominal bloating, early satiety.  At the time of the visit we discussed the recent nuclear gastric emptying study which was reported normal.  The patient denied having any acid reflux, dysphagia, odynophagia, there was no nausea or vomiting.  It seemed that the patient's glucose was bouncing up and down and he was unable to control he is glucose level with diet.  The patient was advised to follow a gastroparesis diet, was recommended to consult with a nutritionist for further advice.  The patient was also advised to remain on Tagamet 200 mg at bedtime, the patient was taking laxatives and was defecating every 2nd-3rd day soft stool with no blood.  The patient was advised to get a surveillance colonoscopy in October 2022.  Today the patient returns to the office stating that he is about to undergo extensive vascular surgery to his lower extremity where he has a large ulceration which has been undergoing debridement and treatment by the burn center.  He is in the process of getting vascular surgery to bypass one of his vessels and he is extremely upset about it.  The patient has been following the gastroparesis diet and has fewer episodes of abdominal bloating, denied having any nausea, vomiting, has occasional heartburn which she has controlled with Tagamet 200 mg twice daily.  The patient is having normal bowel movements.  The patient will remain on Senokot and MiraLAX as needed for constipation, Tagamet 200 mg twice daily, antireflux measures and a gastroparesis diet and will return for a follow-up visit at the end of October 2022 when he should be scheduled to have a surveillance colonoscopy in view of his previous history of adenomatous colonic polyps.

## 2022-07-21 PROBLEM — 428054006: Status: ACTIVE | Noted: 2022-03-31

## 2022-07-21 PROBLEM — 721691004: Status: ACTIVE | Noted: 2021-10-19

## 2022-07-21 PROBLEM — 442076002: Status: ACTIVE | Noted: 2022-03-31

## 2022-07-21 PROBLEM — 428054006: Status: ACTIVE | Noted: 2021-10-19

## 2022-07-21 PROBLEM — 5964004: Status: ACTIVE | Noted: 2020-11-17

## 2022-09-30 ENCOUNTER — TELEPHONE ENCOUNTER (OUTPATIENT)
Dept: URBAN - METROPOLITAN AREA CLINIC 74 | Facility: CLINIC | Age: 72
End: 2022-09-30

## 2022-10-27 ENCOUNTER — OFFICE VISIT (OUTPATIENT)
Dept: URBAN - METROPOLITAN AREA CLINIC 74 | Facility: CLINIC | Age: 72
End: 2022-10-27

## 2023-05-31 ENCOUNTER — DASHBOARD ENCOUNTERS (OUTPATIENT)
Age: 73
End: 2023-05-31

## 2023-05-31 ENCOUNTER — CLAIMS CREATED FROM THE CLAIM WINDOW (OUTPATIENT)
Dept: URBAN - METROPOLITAN AREA CLINIC 74 | Facility: CLINIC | Age: 73
End: 2023-05-31
Payer: MEDICARE

## 2023-05-31 ENCOUNTER — LAB OUTSIDE AN ENCOUNTER (OUTPATIENT)
Dept: URBAN - METROPOLITAN AREA CLINIC 74 | Facility: CLINIC | Age: 73
End: 2023-05-31

## 2023-05-31 VITALS
HEART RATE: 73 BPM | TEMPERATURE: 97.8 F | WEIGHT: 189 LBS | DIASTOLIC BLOOD PRESSURE: 70 MMHG | SYSTOLIC BLOOD PRESSURE: 138 MMHG | HEIGHT: 73 IN | BODY MASS INDEX: 25.05 KG/M2

## 2023-05-31 DIAGNOSIS — Z85.46 HISTORY OF PROSTATE CANCER: ICD-10-CM

## 2023-05-31 DIAGNOSIS — Z79.01 CHRONIC ANTICOAGULATION: ICD-10-CM

## 2023-05-31 DIAGNOSIS — Z86.010 PERSONAL HISTORY OF COLONIC POLYPS: ICD-10-CM

## 2023-05-31 DIAGNOSIS — K21.9 GERD (GASTROESOPHAGEAL REFLUX DISEASE): ICD-10-CM

## 2023-05-31 DIAGNOSIS — K62.1 HYPERPLASTIC RECTAL POLYP: ICD-10-CM

## 2023-05-31 DIAGNOSIS — N81.6 RECTOCELE: ICD-10-CM

## 2023-05-31 DIAGNOSIS — J44.9 COPD (CHRONIC OBSTRUCTIVE PULMONARY DISEASE): ICD-10-CM

## 2023-05-31 DIAGNOSIS — D12.2 ADENOMATOUS POLYP OF ASCENDING COLON: ICD-10-CM

## 2023-05-31 DIAGNOSIS — I10 ESSENTIAL HYPERTENSION: ICD-10-CM

## 2023-05-31 DIAGNOSIS — K63.5 HYPERPLASTIC POLYP OF CECUM: ICD-10-CM

## 2023-05-31 DIAGNOSIS — D12.3 ADENOMATOUS POLYP OF TRANSVERSE COLON: ICD-10-CM

## 2023-05-31 DIAGNOSIS — K59.01 CONSTIPATION, SLOW TRANSIT: ICD-10-CM

## 2023-05-31 DIAGNOSIS — I73.9 PERIPHERAL VASCULAR DISEASE: ICD-10-CM

## 2023-05-31 DIAGNOSIS — J45.909 ASTHMA: ICD-10-CM

## 2023-05-31 DIAGNOSIS — E13.9 DIABETES 1.5, MANAGED AS TYPE 2: ICD-10-CM

## 2023-05-31 PROCEDURE — 99213 OFFICE O/P EST LOW 20 MIN: CPT | Performed by: INTERNAL MEDICINE

## 2023-05-31 RX ORDER — GABAPENTIN 300 MG/1
TAKE 1 CAPSULE (300 MG) BY ORAL ROUTE 3 TIMES PER DAY CAPSULE ORAL
Qty: 0 | Refills: 0 | Status: ACTIVE | COMMUNITY
Start: 1900-01-01

## 2023-05-31 RX ORDER — POLYETHYLENE GLYCOL 3350 17 G/17G
AS DIRECTED POWDER, FOR SOLUTION ORAL
Status: ACTIVE | COMMUNITY

## 2023-05-31 RX ORDER — ATORVASTATIN CALCIUM 20 MG/1
TABLET, FILM COATED ORAL
Qty: 0 | Refills: 0 | Status: ACTIVE | COMMUNITY
Start: 1900-01-01

## 2023-05-31 RX ORDER — POLYETHYLENE GLYCOL 3350 17 G/17G
AS DIRECTED POWDER, FOR SOLUTION ORAL
OUTPATIENT

## 2023-05-31 RX ORDER — INSULIN ASPART 100 [IU]/ML
AS DIRECTED INJECTION, SUSPENSION SUBCUTANEOUS
Status: ACTIVE | COMMUNITY

## 2023-05-31 RX ORDER — ALPHA-D-GALACTOSIDASE 400 UNIT
1 TABLET AT BEDTIME TABLET ORAL ONCE A DAY
OUTPATIENT

## 2023-05-31 RX ORDER — DICLOFENAC SODIUM 10 MG/G
GEL TOPICAL
Qty: 0 | Refills: 0 | Status: ACTIVE | COMMUNITY
Start: 1900-01-01

## 2023-05-31 RX ORDER — FLUTICASONE PROPIONATE 220 UG/1
2 PUFFS AEROSOL, METERED RESPIRATORY (INHALATION) TWICE A DAY
Status: ACTIVE | COMMUNITY

## 2023-05-31 RX ORDER — CLOPIDOGREL BISULFATE 75 MG
TAKE 1 TABLET (75 MG) BY ORAL ROUTE ONCE DAILY TABLET ORAL 1
Qty: 0 | Refills: 0 | Status: ACTIVE | COMMUNITY
Start: 1900-01-01

## 2023-05-31 RX ORDER — RIVAROXABAN 20 MG/1
TAKE 1 TABLET (20 MG) BY ORAL ROUTE ONCE DAILY WITH THE EVENING MEAL TABLET, FILM COATED ORAL 1
Qty: 0 | Refills: 0 | Status: ACTIVE | COMMUNITY
Start: 1900-01-01

## 2023-05-31 RX ORDER — LISINOPRIL AND HYDROCHLOROTHIAZIDE TABLETS 20; 12.5 MG/1; MG/1
TAKE 1 TABLET BY ORAL ROUTE ONCE DAILY TABLET ORAL 1
Qty: 0 | Refills: 0 | Status: ACTIVE | COMMUNITY
Start: 1900-01-01

## 2023-05-31 NOTE — HPI-TODAY'S VISIT:
Today November 18, 2020 the patient returns for a follow-up visit, the patient was last seen in the office on February 24, 2020 with generalized abdominal tenderness, constipation due to slow transit and outlet dysfunction, abdominal bloating, personal history of colonic polyps, chronic anticoagulation, peripheral vascular disease, history of prostate cancer, COPD, hypertension, asthma and a history of poliomyelitis. The patient had a colonoscopy 4 years before the visit and it showed several serrated adenomas.. The patient was advised to have a sits marker study, and an MR defecography and a colonoscopy. The patient had the MR defecogram on March 6, 2020 and it showed a small anterior rectocele, no evidence of rectal intussusception or dyssynergic defecation. The patient had the sits marker study and on March 11, 2022 frontal views of the abdomen where evaluated, on that particular day no definite markers were seen in the ascending and transverse colon.  There was one marker seen in the left colon.  There was a moderate amount of stool and gas seen throughout the colon, there was no evidence of bowel obstruction. The colonoscopy was not performed due to scheduling issues including COVID-19. The patient has been cleared by vascular surgery to hold anticoagulation. The patient continues to have severe constipation, along with a constipation he gets pain across the lower back and upper abdomen, he denies any nausea, vomiting, rectal bleeding or hematochezia. The patient will be scheduled to have the colonoscopy.  During a previous colonoscopy I did remove multiple colonic polyps, benefits potential complications and alternatives to colonoscopy have been disclosed. Today January 7, 2021 the patient returns for a follow-up visit, the patient was last seen in the office on November 18, 2020 with slow transit constipation, and rectocele, personal history of colonic polyps, bloating, GERD, history of prostate cancer, asthma, essential hypertension, COPD, peripheral vascular disease on chronic anticoagulation. At the time of the visit the patient complaint of severe constipation, along with a constipation he did get pain across the lower back and upper abdomen, the patient denied having any nausea, vomiting or rectal bleeding.  The patient was scheduled to have a colonoscopy, during the previous colonoscopy I did remove multiple colonic polyps.  The patient had a sitz marker study in March 2020 that showed a single marker in the left colon, there was a moderate amount of stool and gas throughout the colon with no evidence of obstruction.  An MR defecography showed a small anterior rectocele with no evidence of rectal intussusception or dyssynergic defecation. The colonoscopy was performed on December 17, 2020, multiple polyps were encountered, there was a 20 mm sessile cecal serrated adenomatous polyp which was removed with a hot snare, a 5 mm ascending colon sessile serrated adenomatous polyp removed with a cold snare, a 12 mm ascending colon semipedunculated adenomatous polyp removed with a hot snare, the patient required 1 hemostatic clip to prevent bleeding, there was a 5 mm sessile polyp removed from the transverse colon with a cold snare and a 3 mm rectal hyperplastic polyp removed from the rectum with cold biopsy forceps. Today the patient returns stating that he continues to have some difficulty expelling stool, slight constipation, in the past he did respond to MiraLAX 17 g and he has been advised to restart MiraLAX 17 g either daily or every other day. The patient denied having any abdominal pain but does complain of increasing flatulence which improves after defecation. The patient's gastroesophageal reflux remains a problem, the patient was instructed to follow antireflux measures and diet, the patient will start pantoprazole 40 mg daily. I discussed at length with the sitz marker study and the MR defecogram, the patient is not aware that he does have a small rectocele. The patient has been advised to have a surveillance colonoscopy in 6 months, he did have several serrated adenomatous polyps, and in the past multiple polyps had been removed. The patient will return for a follow-up visit in 6 months and will be scheduled for the colonoscopy at the time. Today August 18, 2021 the patient returns for a follow-up visit, the patient was last seen in the office on January 7, 2021 with a personal history of colonic polyps, constipation and a rectocele, gastroesophageal reflux disease, bloating, history of prostate cancer, asthma, COPD, hypertension, peripheral vascular disease on chronic anticoagulation. At the time of the last visit the patient returned stating that he had some difficulty expelling stool, the patient had slight constipation, in the past did not respond to treatment with MiraLAX 17 g and was advised to take the MiraLAX either every day or every other day. The patient denied having any abdominal pain, complains of increasing flatulence which improved after defecation. The patient's gastroesophageal reflux remains a problem, the patient was instructed to follow antireflux measures and diet, the patient was advised to take pantoprazole 40 mg daily. At the time of the visit I did discuss at length the findings of the transit time with markers and MR defecogram, the patient was made aware that he had a rectocele. The patient was advised to have a surveillance colonoscopy in 6 months.  In the past he had several serrated adenomatous polyps as well as older types of polyps.  Today the patient returns to the office stating that while taking MiraLAX for several months he did well, subsequently he stopped taking the MiraLAX, after 6 to 8 weeks he again developed constipation so he replaced the MiraLAX with Senokot, currently he has 1 or 2 soft bowel movements per day, before BMs the patient develops abdominal bloating and some flatulence which improved after defecation.  The patient denied any rectal bleeding or hematochezia.  The patient's gastroesophageal reflux remains under control with pantoprazole 40 mg daily.  The patient is due to have a surveillance colonoscopy, at the time of the previous colonoscopy he had multiple adenomatous and serrated polyps.  The patient will be scheduled for the colonoscopy.  Benefits potential complications and alternatives to colonoscopy were disclosed.  Today October 20, 2021 the patient returns for a follow-up visit, the patient was last seen in the office on August 18, 2021 with a personal history of colonic polyps, constipation, rectocele, GERD, bloating, history of prostate cancer, asthma, hypertension, COPD, peripheral vascular disease on chronic anticoagulation.  At the time of the last visit the patient returns stating that while taking MiraLAX for several months he did well, subsequently he stopped taking the MiraLAX and 6 to 8 weeks later he developed constipation, he replaced the MiraLAX with Senokot and was having 1-2 soft bowel movements per day, prior to bowel movements he developed abdominal bloating and some flatulence, all improved after defecation.  The patient denied having any rectal bleeding or hematochezia.  The gastroesophageal reflux remains under control with pantoprazole 40 mg daily.  The patient was advised to get a surveillance colonoscopy.  At the time of the previous colonoscopy he had multiple colonic polyps.  The colonoscopy was performed on October 5, 2021, at the time he was found to have perianal skin tags, 7 hyperplastic polyps were removed from the rectum measuring between 2 and 3 mm, there was a 5 mm ascending adenomatous colon polyp which was sessile and removed with a cold snare, a 3 mm adenomatous polyp removed from the transverse colon he was also sessile and a 3 mm cecal hyperplastic polyp removed with cold biopsy forceps.  The patient will be advised to have a follow-up surveillance colonoscopy in 1 year.  Today the patient returns to the office stating that he is mostly doing well, his main complaint at this point in time is no longer constipation or abdominal bloating, he seems to be defecating daily ever since he had the colonoscopy.  The patient does complain of gastroesophageal reflux, he has mostly acid reflux in the evening when he lies down and has agreed to elevate the head of his bed.  The patient did not take the pantoprazole as instructed and is taking an over-the-counter omeprazole tablet with only relative improvement.  I discussed at length with the patient antireflux measures and diet as well as the use of the medication and the elevation of the head of the bed.  The patient will try it for the next 60 days if not improved he agreed to have an EGD.  The patient will return for a follow-up visit in 60 days.  The patient is to have a surveillance colonoscopy in 1 year.  I discussed at length with the patient the recent colonoscopy and pathology reports.  Today Decmber 13 2021 the patient returns for a follow-up visit, the patient was last seen on October 20, 2021 with adenomatous colonic polyps hyperplastic colonic polyps, past history of colonic polyps, constipation, rectocele, gastroesophageal reflux disease, bloating, history of prostate cancer, asthma, COPD, hypertension, peripheral vascular disease on anticoagulation.  At the time of the last visit the patient appeared to be doing well, the patient's main complaint was gastroesophageal reflux, the patient was having reflux in the evening when lying down, the patient agreed to elevate the head of the bed, the patient had not been compliant on the ingestion of pantoprazole as instructed, he was taking over-the-counter omeprazole with only relative improvement.  At the time we discussed antireflux measures and diet and the use of the medications as well as elevation of the head of the bed. The patient agreed to try all the above for 60 days as instructed and if not improved he would be scheduled to have an EGD. The patient was also advised to get a surveillance colonoscopy in 1 year.  Today the patient returns to the office stating that once you run out of pantoprazole which she was taking twice a day he replaced it with Tagamet 200 mg which he takes once a day and no longer has any heartburn.  The patient claims that all that he has a slow gastric emptying and that he feels up quickly, and has dyspepsia.  The patient agreed to have a nuclear gastric emptying study.  He will remain on Tagamet 200 mg daily which she gets over-the-counter.  The patient will return for a follow-up visit after completion of testing.  Today March 31, 2022 the patient returns for a follow-up visit, the patient was last seen in the office on December 13, 2021 with dyspepsia, early satiety, GERD, bloating, adenomatous colonic polyps and hyperplastic colonic polyps constipation due to slow transit, rectocele, history of prostate cancer, asthma, hypertension, COPD, peripheral vascular disease on chronic anticoagulation.  At the time of the last visit the patient appeared to be doing well, the patient's main complaint was gastroesophageal reflux, the patient was having reflux in the evening when lying down, the patient agreed to elevate the head of the bed, follow antireflux measures and diet and take pantoprazole 40 mg daily.  The patient agreed to have an EGD if not improved.  At the time of the visit the patient stated that he had ran out of pantoprazole which she was taking twice daily and replaced it with Tagamet 200 mg once daily and no longer had any heartburn.  The patient complained of a slow gastric emptying and complained of dyspepsia along with early satiety.  The patient agreed to have a gastric emptying study and would remain on Tagamet 200 mg daily.  The patient had a nuclear gastric emptying study on January 24, 2022 which was normal at 2 hours.  The patient's last colonoscopy performed on October 5, 2021 revealed multiple colonic polyps, at the time the patient was advised to repeat the colonoscopy in October 2022.  Today the patient returns to the office stating that he continues to have episodes of upper abdominal bloating, early satiety.  We discussed the recent nuclear gastric emptying study which was reported normal.  The patient denies having any acid reflux, dysphagia, odynophagia, there has been no nausea or vomiting.  It seems that the patient's glucose bounces up and down and he has not been able to control his glucose well and has difficulty controlling his diet.  The patient has been recommended to follow a gastroparesis diet with 3 smaller meals and a couple snacks in between but she has also been recommended to consult with the nutritionist for further advice.  For the time being the patient will remain on Tagamet as previously prescribed, currently he is taking no laxatives and has been able to defecate every 2 to 3 days soft formed stool with no blood.  The patient will return for a follow-up visit in 3 months and will be then scheduled to have a surveillance colonoscopy to be performed October 2022.  Today June 30, 2022 the patient returns for a follow-up visit, the patient was last seen on March 31, 2022 with dyspepsia, early satiety, GERD, bloating, personal history of adenomatous and hyperplastic colonic polyps, constipation with a rectocele, history of prostate cancer, history of asthma hypertension and COPD, peripheral vascular disease on chronic anticoagulation and type 2 diabetes mellitus.  At the time of the last visit the patient did complain of episodes of upper abdominal bloating, early satiety.  At the time of the visit we discussed the recent nuclear gastric emptying study which was reported normal.  The patient denied having any acid reflux, dysphagia, odynophagia, there was no nausea or vomiting.  It seemed that the patient's glucose was bouncing up and down and he was unable to control he is glucose level with diet.  The patient was advised to follow a gastroparesis diet, was recommended to consult with a nutritionist for further advice.  The patient was also advised to remain on Tagamet 200 mg at bedtime, the patient was taking laxatives and was defecating every 2nd-3rd day soft stool with no blood.  The patient was advised to get a surveillance colonoscopy in October 2022.  Today the patient returns to the office stating that he is about to undergo extensive vascular surgery to his lower extremity where he has a large ulceration which has been undergoing debridement and treatment by the burn center.  He is in the process of getting vascular surgery to bypass one of his vessels and he is extremely upset about it.  The patient has been following the gastroparesis diet and has fewer episodes of abdominal bloating, denied having any nausea, vomiting, has occasional heartburn which she has controlled with Tagamet 200 mg twice daily.  The patient is having normal bowel movements.  The patient will remain on Senokot and MiraLAX as needed for constipation, Tagamet 200 mg twice daily, antireflux measures and a gastroparesis diet and will return for a follow-up visit at the end of October 2022 when he should be scheduled to have a surveillance colonoscopy in view of his previous history of adenomatous colonic polyps.  Today May 31, 2023 the patient returns for a follow-up visit, the patient was last seen on June 30, 2022 with dyspepsia and early satiety, GERD, bloating, history of adenomatous colonic polyps of the transverse and ascending colon and hyperplastic rectal polyps, constipation due to slow transit with a rectocele, history of prostatic cancer, asthma and hypertension, COPD, peripheral vascular disease on chronic anticoagulation and type 2 diabetes mellitus.  At the time of the last visit the patient returned after having an extensive vascular evaluation and undergoing vascular surgery to the lower extremity, the patient had a large ulceration which was undergoing debridement and treatment by the burn center, he was in the process of having lower extremity bypass surgery and was very upset about this.  The patient was following a gastroparesis diet and was having fewer episodes of bloating, denied having any nausea or vomiting, has occasional heartburn which was controlled with Tagamet 200 mg twice daily.  The patient was having normal bowel movements.  The patient was advised to continue taking Senokot and MiraLAX as needed for constipation,  Tagamet  200 mg twice daily, follow antireflux measures and diet and a gastroparesis diet and return for a follow-up visit at the end of October 2022 to be scheduled for a surveillance colonoscopy in view of the previous history of adenomatous polyps.  Today the patient returns to the office stating that he had extensive lower extremity bypass surgery and remains anticoagulated on Xarelto and Plavix.  The patient would like for us to go ahead and schedule he is surveillance colonoscopy in view of the past history of adenomatous colonic polyps.  We will contact vascular surgery to obtain clearance to hold anticoagulation so we can perform the colonoscopy.  Currently the patient is having regular formed stools with no blood, has only occasional vague discomfort across the lower abdomen which improves after defecation and passage of flatus.  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  The patient will return for a follow-up visit after he completes his evaluation including the colonoscopy.

## 2023-07-27 ENCOUNTER — TELEPHONE ENCOUNTER (OUTPATIENT)
Dept: URBAN - METROPOLITAN AREA CLINIC 74 | Facility: CLINIC | Age: 73
End: 2023-07-27

## 2024-01-29 NOTE — PHYSICAL EXAM HENT:
Head, normocephalic, atraumatic, Face, Face within normal limits, Ears, External ears within normal limits, Nose/Nasopharynx, External nose  normal appearance, nares patent, no nasal discharge, Mouth and Throat, Oral cavity appearance normal, Breath odor normal, Lips, Appearance normal
157.48